# Patient Record
Sex: MALE | Race: WHITE | Employment: OTHER | URBAN - METROPOLITAN AREA
[De-identification: names, ages, dates, MRNs, and addresses within clinical notes are randomized per-mention and may not be internally consistent; named-entity substitution may affect disease eponyms.]

---

## 2019-03-04 ENCOUNTER — OFFICE VISIT (OUTPATIENT)
Dept: ENDOCRINOLOGY | Age: 64
End: 2019-03-04
Payer: COMMERCIAL

## 2019-03-04 VITALS
SYSTOLIC BLOOD PRESSURE: 108 MMHG | WEIGHT: 175 LBS | BODY MASS INDEX: 28.12 KG/M2 | DIASTOLIC BLOOD PRESSURE: 72 MMHG | HEART RATE: 48 BPM | OXYGEN SATURATION: 97 % | HEIGHT: 66 IN

## 2019-03-04 DIAGNOSIS — M48.061 SPINAL STENOSIS AT L4-L5 LEVEL: ICD-10-CM

## 2019-03-04 DIAGNOSIS — G20 PARKINSON DISEASE (HCC): ICD-10-CM

## 2019-03-04 DIAGNOSIS — R73.01 IMPAIRED FASTING GLUCOSE: ICD-10-CM

## 2019-03-04 DIAGNOSIS — M19.90 ARTHRITIS: ICD-10-CM

## 2019-03-04 DIAGNOSIS — E05.00 GRAVES DISEASE: ICD-10-CM

## 2019-03-04 DIAGNOSIS — M81.0 AGE-RELATED OSTEOPOROSIS WITHOUT CURRENT PATHOLOGICAL FRACTURE: Primary | ICD-10-CM

## 2019-03-04 DIAGNOSIS — R79.89 ABNORMAL THYROID BLOOD TEST: ICD-10-CM

## 2019-03-04 PROBLEM — G20.A1 PARKINSON DISEASE: Status: ACTIVE | Noted: 2019-03-04

## 2019-03-04 PROCEDURE — 1101F PT FALLS ASSESS-DOCD LE1/YR: CPT | Performed by: INTERNAL MEDICINE

## 2019-03-04 PROCEDURE — G8419 CALC BMI OUT NRM PARAM NOF/U: HCPCS | Performed by: INTERNAL MEDICINE

## 2019-03-04 PROCEDURE — 99203 OFFICE O/P NEW LOW 30 MIN: CPT | Performed by: INTERNAL MEDICINE

## 2019-03-04 PROCEDURE — 3017F COLORECTAL CA SCREEN DOC REV: CPT | Performed by: INTERNAL MEDICINE

## 2019-03-04 PROCEDURE — G8427 DOCREV CUR MEDS BY ELIG CLIN: HCPCS | Performed by: INTERNAL MEDICINE

## 2019-03-04 PROCEDURE — G8484 FLU IMMUNIZE NO ADMIN: HCPCS | Performed by: INTERNAL MEDICINE

## 2019-03-04 SDOH — HEALTH STABILITY: MENTAL HEALTH: HOW OFTEN DO YOU HAVE A DRINK CONTAINING ALCOHOL?: NEVER

## 2019-03-07 ENCOUNTER — HOSPITAL ENCOUNTER (OUTPATIENT)
Dept: GENERAL RADIOLOGY | Age: 64
Discharge: HOME | End: 2019-03-07
Payer: COMMERCIAL

## 2019-03-07 DIAGNOSIS — M81.0 AGE-RELATED OSTEOPOROSIS WITHOUT CURRENT PATHOLOGICAL FRACTURE: ICD-10-CM

## 2019-03-07 DIAGNOSIS — R73.01 IMPAIRED FASTING GLUCOSE: ICD-10-CM

## 2019-03-07 DIAGNOSIS — M19.90 ARTHRITIS: ICD-10-CM

## 2019-03-07 DIAGNOSIS — R79.89 ABNORMAL THYROID BLOOD TEST: ICD-10-CM

## 2019-03-07 DIAGNOSIS — G20 PARKINSON DISEASE (HCC): ICD-10-CM

## 2019-03-07 DIAGNOSIS — M48.061 SPINAL STENOSIS AT L4-L5 LEVEL: ICD-10-CM

## 2019-03-07 PROCEDURE — 77080 DXA BONE DENSITY AXIAL: CPT

## 2019-03-11 DIAGNOSIS — R79.89 ABNORMAL THYROID BLOOD TEST: ICD-10-CM

## 2019-03-11 DIAGNOSIS — R73.01 IMPAIRED FASTING GLUCOSE: ICD-10-CM

## 2019-03-11 DIAGNOSIS — G20.A1 PARKINSON DISEASE: ICD-10-CM

## 2019-03-11 DIAGNOSIS — M48.061 SPINAL STENOSIS AT L4-L5 LEVEL: ICD-10-CM

## 2019-03-11 DIAGNOSIS — E05.00 GRAVES DISEASE: ICD-10-CM

## 2019-03-11 DIAGNOSIS — M19.90 ARTHRITIS: ICD-10-CM

## 2019-03-11 DIAGNOSIS — M81.0 AGE-RELATED OSTEOPOROSIS WITHOUT CURRENT PATHOLOGICAL FRACTURE: ICD-10-CM

## 2019-03-11 LAB
24HR URINE VOLUME (ML): 2200 ML
A/G RATIO: 1.4 (ref 1.1–2.2)
ALBUMIN SERPL-MCNC: 4.2 G/DL (ref 3.4–5)
ALP BLD-CCNC: 98 U/L (ref 40–129)
ALT SERPL-CCNC: <5 U/L (ref 10–40)
ANION GAP SERPL CALCULATED.3IONS-SCNC: 20 MMOL/L (ref 3–16)
AST SERPL-CCNC: 9 U/L (ref 15–37)
BILIRUB SERPL-MCNC: <0.2 MG/DL (ref 0–1)
BUN BLDV-MCNC: 11 MG/DL (ref 7–20)
CALCIUM 24 HOUR URINE: 334 MG/24 HR (ref 42–353)
CALCIUM SERPL-MCNC: 9.8 MG/DL (ref 8.3–10.6)
CHLORIDE BLD-SCNC: 103 MMOL/L (ref 99–110)
CO2: 20 MMOL/L (ref 21–32)
CREAT SERPL-MCNC: 0.8 MG/DL (ref 0.8–1.3)
CREAT SERPL-MCNC: 0.8 MG/DL (ref 0.8–1.3)
CREATININE 24 HOUR URINE: 1.4 G/24HR (ref 0.6–2.5)
CREATININE CLEARANCE: 122 ML/MIN (ref 100–140)
FOLLICLE STIMULATING HORMONE: 9.5 MIU/ML
GFR AFRICAN AMERICAN: >60
GFR NON-AFRICAN AMERICAN: >60
GLOBULIN: 2.9 G/DL
GLUCOSE BLD-MCNC: 112 MG/DL (ref 70–99)
LUTEINIZING HORMONE: 8.9 MIU/ML
Lab: 24 HR
PARATHYROID HORMONE INTACT: 25.6 PG/ML (ref 14–72)
PHOSPHORUS: 3.7 MG/DL (ref 2.5–4.9)
POTASSIUM SERPL-SCNC: 4.4 MMOL/L (ref 3.5–5.1)
SODIUM 24 HOUR URINE: 150 MMOL/24 HR (ref 40–220)
SODIUM BLD-SCNC: 143 MMOL/L (ref 136–145)
T4 TOTAL: 6 UG/DL (ref 4.5–10.9)
TOTAL PROTEIN: 7.1 G/DL (ref 6.4–8.2)
TSH SERPL DL<=0.05 MIU/L-ACNC: 1.58 UIU/ML (ref 0.27–4.2)

## 2019-03-12 LAB
ANTI-THYROGLOB ABS: <10 IU/ML
ESTIMATED AVERAGE GLUCOSE: 128.4 MG/DL
HBA1C MFR BLD: 6.1 %
THYROID PEROXIDASE (TPO) ABS: 10 IU/ML

## 2019-03-13 LAB
ALK PHOS BONE SPECIFIC: 13.6 UG/L (ref 6.5–20.1)
CELIAC PANEL: 6 UNITS (ref 0–19)
SEX HORMONE BINDING GLOBULIN: 87 NMOL/L (ref 11–80)
TESTOSTERONE FREE PERCENT: 1 % (ref 1.6–2.9)
TESTOSTERONE FREE, CALC: 44 PG/ML (ref 47–244)
TESTOSTERONE TOTAL-MALE: 446 NG/DL (ref 300–720)
TESTOSTERONE, BIOAVAILABLE: 111 NG/DL (ref 131–682)
TSH RECEPTOR AB: <0.9 IU/L

## 2019-03-16 LAB — THYROID STIMULATING IMMUNOGLOBULIN: 89 %

## 2019-04-03 ENCOUNTER — OFFICE VISIT (OUTPATIENT)
Dept: ENDOCRINOLOGY | Age: 64
End: 2019-04-03
Payer: COMMERCIAL

## 2019-04-03 VITALS
HEART RATE: 75 BPM | WEIGHT: 179.8 LBS | DIASTOLIC BLOOD PRESSURE: 68 MMHG | HEIGHT: 66 IN | OXYGEN SATURATION: 99 % | SYSTOLIC BLOOD PRESSURE: 104 MMHG | BODY MASS INDEX: 28.9 KG/M2

## 2019-04-03 DIAGNOSIS — E29.1 HYPOGONADISM IN MALE: Primary | ICD-10-CM

## 2019-04-03 DIAGNOSIS — M81.0 AGE-RELATED OSTEOPOROSIS WITHOUT CURRENT PATHOLOGICAL FRACTURE: ICD-10-CM

## 2019-04-03 DIAGNOSIS — Z00.00 ENCOUNTER FOR SCREENING AND PREVENTATIVE CARE: ICD-10-CM

## 2019-04-03 DIAGNOSIS — M19.90 ARTHRITIS: ICD-10-CM

## 2019-04-03 DIAGNOSIS — G20 PARKINSON DISEASE (HCC): ICD-10-CM

## 2019-04-03 DIAGNOSIS — M48.061 SPINAL STENOSIS AT L4-L5 LEVEL: ICD-10-CM

## 2019-04-03 LAB — PROSTATE SPECIFIC ANTIGEN: 0.88 NG/ML (ref 0–4)

## 2019-04-03 PROCEDURE — 1123F ACP DISCUSS/DSCN MKR DOCD: CPT | Performed by: INTERNAL MEDICINE

## 2019-04-03 PROCEDURE — 99215 OFFICE O/P EST HI 40 MIN: CPT | Performed by: INTERNAL MEDICINE

## 2019-04-03 PROCEDURE — 1036F TOBACCO NON-USER: CPT | Performed by: INTERNAL MEDICINE

## 2019-04-03 PROCEDURE — G8427 DOCREV CUR MEDS BY ELIG CLIN: HCPCS | Performed by: INTERNAL MEDICINE

## 2019-04-03 PROCEDURE — 4040F PNEUMOC VAC/ADMIN/RCVD: CPT | Performed by: INTERNAL MEDICINE

## 2019-04-03 PROCEDURE — G8419 CALC BMI OUT NRM PARAM NOF/U: HCPCS | Performed by: INTERNAL MEDICINE

## 2019-04-03 PROCEDURE — 3017F COLORECTAL CA SCREEN DOC REV: CPT | Performed by: INTERNAL MEDICINE

## 2019-04-03 RX ORDER — TESTOSTERONE 16.2 MG/G
1 GEL TRANSDERMAL DAILY
Qty: 1 BOTTLE | Refills: 3 | Status: SHIPPED | OUTPATIENT
Start: 2019-04-03 | End: 2019-08-20

## 2019-04-03 NOTE — PROGRESS NOTES
Nolberto Meng MD  [facility]) Physicians  [facility] [place] and [facility]ology  Rheumatology Consult Note    HISTORY OF PRESENT ILLNESS:    The pt is a 71 y.o. male referred by Dr. Baldev Toussaint for joint pain. Pt reports a 15 yr Hx of b/l wrist pain worse w/ flexion, he reports 2 episodes of sudden onset swelling and pain in his wrist joints over the past yr most recently this occurred 3-4 months ago, each episode self resolves after a couple of days. He reports constant stiffness in his fingers, this does not improve throughout the day. He denies any joint swelling in the hand joints. Denies Hx of podagra. He is no longer able to operate his . He is s/p b/l CTS surgery, R TKR and was told that his L knee also needs to get replaced. Knees hurt sometimes w/ walking, denies joint swelling. Pt states he does not take any medication for his joint pain including NSAIDs, Acetaminophen, or steroids. Of note, pt reports an extensive Hx of neck and lower back degenerative arthritis and spinal stenosis. He has undergone fusion of his C- and L-spine. He currently reports worsening pain in his neck, he reports difficulty driving d/t neck pain - he reports limited lateral rotation d/t his neck pain. He states that he initially started having pain in his neck in his 45s and lower back pain around age 38. He thinks his back pain was exacerbated by activity as he previously worked as a mailman, he does not recall significant morning stiffness. He states he cannot feel \"hot or cold\" on the bottom of his R foot, this numbness radiates up to his R thigh, L foot feels \"very sensitive\". He feels a burning pain throughout his BLE. He does know if he has any LE weakness, he states he is very [place]" when he walks and he uses a walker to get around. He denies any bowel or bladder incontinence, denies saddle anesthesia.   He is currently following w/ [facility], his recent CT cervical and lumbar spine from 3/12/19 showed hardware loosening in the C-spine and unstable L4-5 fusion. Pt states he has an upcoming appt w/ Neurosurgery next wk. Eyes turn red at times but he denies any dry eyes, gritty sensation or vision change. Denies Hx of enthesitis, PsO or Sx of IBD. Pt currently follows w/ Endocrinology for the management of his osteoporosis. He has a Hx of vertebral fax (T8) from falling off a ladder in 2009, L humeral fx from falling in the shower. Pt recalls he previously took Fosamax for \"a couple of yrs\". Per Dr. Edilberto Jenkins note pt was then switched to [facility] [place] however pt does not recall this. He states that he came off bisphosphonate therapy d/t dental implants. Pt states he was recently started on topical Testosterone. His mother had deforming arthritis, he thinks this was RA. Sister, brother and son both have Grave's disease. Brother has Crohn's disease. Pt is a former smoker, previously smoked 10 yrs. Denies Hx of alcohol or drug abuse.     REVIEW OF SYSTEMS:    Constitutional: +chronic fatigue, denies fever/chills, night sweats, unintentional weight loss  Integumentary: denies photosensitivity, rash, patchy alopecia, or Sx of Raynaud's phenomenon  Eyes: denies dry eyes, redness or pain, visual disturbance, or floaters  Ears: denies hearing loss, tinnitus, vertigo, or recurrent ear infections  Nose: denies nasal ulcers or recurrent sinusitis  Oral cavity: denies dry mouth or oral ulcers  Cardiovascular: denies CP, palpitations, Hx of pericardial effusion or pericarditis  Respiratory: denies SOB, cough, hemoptysis, or pleurisy  Gastrointestinal: denies heart burn, dysphagia or esophageal dysmotility, denies change in bowel habits or Sx of IBD  Genitourinary: denies change in urine amount or urine appearance, denies frothy urine or Hx of nephrolithiasis  Hematologic/Lymphatic: denies abnormal bruising or bleeding, denies Hx of blood clots, denies swollen LNs  Musculoskeletal:  refer file     Forced sexual activity: Not on file   Other Topics Concern    Not on file   Social History Narrative    Not on file        Family History   Problem Relation Age of Onset    Osteoporosis Mother     Cancer Father         skin     Prostate Cancer Brother 77       MEDICATIONS:    Current Outpatient Medications:     carbidopa-levodopa (SINEMET)  MG per tablet, Take 3 tabs five times per day, Disp: , Rfl: 5    Multiple Vitamin (MULTI VITAMIN MENS PO), Take 1 tablet by mouth Take 1 tab po daily, Disp: , Rfl:     Cholecalciferol (VITAMIN D PO), Take 50,000 Units by mouth, Disp: , Rfl:     Testosterone (ANDROGEL) 20.25 MG/ACT (1.62%) GEL gel, Place 1 actuation onto the skin daily for 120 days. , Disp: 1 Bottle, Rfl: 3    ALLERGIES:  Patient has no known allergies.     PHYSICAL EXAM:    Vitals:    /87 (Site: Left Upper Arm, Position: Sitting, Cuff Size: Medium Adult)   Pulse 69   Ht 5' 5.1\" (1.654 m)   Wt 180 lb (81.6 kg)   BMI 29.86 kg/m²     GEN: AAOx3, in NAD, accompanied by wife  HEAD: normocephalic, atraumatic  EYES: EOMI, PERRLA, no injection or icterus  NOSE: no nasal ulcers or nasal drainage  ORAL CAVITY: dry oral mucosa, no oral lesions, no evidence of thrush, no evidence of parotid gland enlargement  NECK: supple w/ FROM, of evidence of lymphadenopathy  CVS: RRR, no murmurs rubs or gallops  LUNGS: in no acute respiratory distress, CTAB  ABDOMEN: +BS, soft, NT/ND, no evidence of organomegaly  LYMPH: no lymphadenopathy or palpable masses  MSK:   Spine: there is significant kyphosis of the T-spine, C-spine and paraspinal and shoulder strap muscles diffusely TTP, C-spine w/ limited ROM, SI joints NTTP  Upper extremities:   Hands: no synovitis in the MCP, PIP, or DIP joints b/l, no dactylitis, able to make full fists w/ good  strength b/l   Wrist: there is extensive bony hypertrophy of the b/l wrist joints, L>R, w/ significant fusion of the wrist joints and very limited flexion/extension, no synovitis, wrist joints NTTP   Elbow: no synovitis or bursitis, FROM   Shoulders: no pain or swelling or warmth on palpation, FROM  Lower extremities:   Hip: negative CHIQUIS test b/l, trochanteric bursa NTTP b/l   Knees: s/p R TKR, no warmth or effusion present, +crepitus in L knee   Ankles: no synovitis, FROM   Feet: no toe swelling or pain or warmth on palpation w/ FROM, negative MTP squeeze test  NEURO: there is a resting tremor and bradykinesia, CN II-XII grossly intact intact, face appears symmetrical, brisk patellar reflexes b/l, 5/5 strength proximally and distally throughout in both upper and lower extremities, decreased sharp vs. dull sensation most pronounced in R foot radiating up to the R thigh > LLE  INTEGUMENTARY: no rash or psoriatic lesions, no petechiae, bruises, or palpable purpura, no patchy alopecia, no nail or periungual changes, no clubbing or digital ulcers  PSYCH: blunted affect    LABS:  I personally reviewed prior labs including:  CBC w/ diff (2/25/19): normal WBC, normal H/H, plt count 431  CMP (3/11/19): normal renal and hepatic function  TSH, T4, thyroid peroxidase, anti-thyroglob Ab negative (3/11/19)  HgbA1c 6.1 (3/11/19)    RADIOLOGY REVIEW:  I personally reviewed prior imaging including:  CT C-spine (3/12/19):  Postoperative findings of multilevel anterior and posterior fusion and laminectomy w/ significant loosening and extrusion of the posterior fusion hardware at C5, C6, and T1. The interbody grafts at C4-5 and C5-6 are subtotally incorporated with minimal subtle questionable lucency around the C4 screws, no definite screw loosening at C5 or C6. Advanced multilevel cervical degenerative disc disease and facet arthrosis causing multilevel neural foraminal narrowing. Other level specific details are discussed above and on the prior MRI report. The cord compression and cord signal abnormality at C6-7 is much better demonstrated on MRI.      CT L-spine (3/12/19):  Postoperative findings of L4-S1 fusion again identified with similar appearance of bilateral L4 pedicle screw loosening, with subtle increased anterolisthesis at L4-5. Asymmetric widening of a left facet defect at L4-5 is similar to slightly more evident, with suspected pseudoarthrosis along the medial margin of a previously ankylosed right L4-5 facet joint. Unstable L4-5 fusion is again suspected. Definite loosening of the right L5 pedicle screw is not seen. The L5-S1 fusion appears solid. Multilevel degenerative disc disease and facet stenosis with other level specific details discussed above. The thecal sac is again obscured at the postoperative sites. Above results were discussed w/ the pt in detail during today's visit. ASSESSMENT/PLAN:  71 y.o. male p/w chronic arthralgias, neck and lower back pain. PMHx pertinent for Hx of osteoporosis, Parkinson's disease, s/p b/l CTS surgery, cervical myelopathy s/p C5-T1 posterior decompression fixation and fusion w/ concern for hardware failure, spinal stenosis and degenerative arthritis of the L-spine w/ unstable L4-5 fusion seen on recent CT. Physical exam finding of significant wrist fusion on exam suggestive of long standing inflammatory arthritis, DDx includes CPPD arthropathy, RA type w/ Hx of pseudogout. He has a strong FHx of autoimmune disease. Will obtain rheumatologic w/u for inflammatory arthritis including spondyloarthropathy given his long standing back pain although he does not recall Hx of prolonged morning stiffness. Recent CT L-spine from 3/12/19 showed \"mild b/l SI joint arthrosis\". I personally reviewed his most recent Neurosurgery note from 3/5/19, recent imaging and w/u, pt has close f/u w/ Neurosurgery next wk. For his LE paresthesias, I suggested obtaining an EMG study and f/u w/ his currently neurologist, would consider a trial of Gabapentin for his neuropathic pain.   Pt prefers to obtain EMG study at [facility] facility however, placed order for EMG study today.     Orders Placed This Encounter   Procedures    XR HAND LEFT (MIN 3 VIEWS)     Standing Status:   Future     Number of Occurrences:   1     Standing Expiration Date:   4/4/2020     Scheduling Instructions:      Evaluate for joint space narrowing, erosion, and chondrocalcinosis     Order Specific Question:   Reason for exam:     Answer:   Evaluate for joint space narrowing, erosion, and chondrocalcinosis    XR HAND RIGHT (MIN 3 VIEWS)     Standing Status:   Future     Number of Occurrences:   1     Standing Expiration Date:   4/4/2020     Scheduling Instructions:      Evaluate for joint space narrowing, erosion, and chondrocalcinosis     Order Specific Question:   Reason for exam:     Answer:   Evaluate for joint space narrowing, erosion, and chondrocalcinosis    XR SacroilIAC Joints PA and Oblique     Standing Status:   Future     Number of Occurrences:   1     Standing Expiration Date:   5/4/2019     Order Specific Question:   Reason for exam:     Answer:   evaluate for sacroiliitis or evidence of inflammatory changes seen in spondyloarthropathy    C-Reactive Protein     Standing Status:   Future     Number of Occurrences:   1     Standing Expiration Date:   5/4/2019    Sedimentation Rate     Standing Status:   Future     Number of Occurrences:   1     Standing Expiration Date:   5/4/2019    Hepatitis B Core Antibody, Total     Standing Status:   Future     Number of Occurrences:   1     Standing Expiration Date:   5/4/2019    Hepatitis B Surface Antigen     Standing Status:   Future     Number of Occurrences:   1     Standing Expiration Date:   5/4/2019    Hepatitis C Antibody     Standing Status:   Future     Number of Occurrences:   1     Standing Expiration Date:   0/6/6868    Cyclic Citrul Peptide Antibody, IgG     Standing Status:   Future     Number of Occurrences:   1     Standing Expiration Date:   5/4/2019    Rheumatoid Factor     Standing Status:   Future     Number of Occurrences:   1     Standing Expiration Date:   5/4/2019    HLA-B27 Antigen     Standing Status:   Future     Number of Occurrences:   1     Standing Expiration Date:   5/4/2019    Electrophoresis Protein, Serum without Reflex to Immunofixation     Standing Status:   Future     Number of Occurrences:   1     Standing Expiration Date:   6/4/2019    Protein Electrophoresis, Urine     Standing Status:   Future     Number of Occurrences:   1     Standing Expiration Date:   6/4/2019   Carolyn Figueredo MD (Inpatient and Outpatient EMG), [facility]     Referral Priority:   Routine     Referral Type:   Eval and Treat     Referral Reason:   Specialty Services Required     Referred to Provider:   Ervin Mott MD     Requested Specialty:   Neurology     Number of Visits Requested:   1     Outpatient Encounter Medications as of 4/4/2019   Medication Sig Dispense Refill    carbidopa-levodopa (SINEMET)  MG per tablet Take 3 tabs five times per day  5    Multiple Vitamin (MULTI VITAMIN MENS PO) Take 1 tablet by mouth Take 1 tab po daily      Cholecalciferol (VITAMIN D PO) Take 50,000 Units by mouth      Testosterone (ANDROGEL) 20.25 MG/ACT (1.62%) GEL gel Place 1 actuation onto the skin daily for 120 days. 1 Bottle 3     No facility-administered encounter medications on file as of 4/4/2019. Return in about 2 weeks (around 4/18/2019) for lab result discussion and treatment plan. 4/4/2019 3:31 PM      Thank you very much for allowing me to participate in this patient's care. Please do not hesitate to contact me if I can be of further assistance. Danica Mccallum MD  [facility]) Physicians  [facility]  [address] [place] [address] Dr Wu S [address]:[phone]  PAL:[phone]    NOTE: This report is transcribed by using voice recognition software dragon.  Every effort is made to ensure accuracy; however, inadvertent computerized transcription errors may be present.

## 2019-04-03 NOTE — PROGRESS NOTES
Patient is being referred here for evaluation and management of Osteoporosis. He has been diagnosed with Osteoporosis in 2013 by his orthopedic surgeon Dr. Grupo Weinberg he was started on Fosamax, he recalls it was switched to [facility] [place] monthly, then he needed dental implants so the bisphosphonates were stopped and were never resumed after wards. As he never followed up with the orthopedic doctor again. He also remembers that his dental implant was not successful . He has taken the following medications for his Osteoporosis:FOsamax     His last DXA scan was done at [facility] results not available . He has no history of fracture of long bones of upper or lower legs. He has  history of vertebral fractures T8 after a fall from ladder in 2009( fall 6-8 feet )   . He has approximately 3 inches height loss as compared to his  young age. He has underwent C-spine fusion surgery as well as lumbar spine fusion surgery as well    He  does  take vitamin D supplements. + MVI. He  has  1-2 daily servings of cheese, milk or yogurt daily. He exercises regularly. He  has no history of long term steroids, chemotherapy, multiple myeloma, prolonged immobilization, liver or kidney disease, malabsorption, organ transplant, or immunosuppressive medications. No history of diabetes. No history of any other agents with adverse effects on bony skeleton. He is not prone to falls and no history of recent falls. No history of rheumatoid arthritis or other autoimmune disorders. positive history of kidney stones in the past once . No history of proximal muscle weakness, thinning of skin, easy bruisability, centripetal accumulation of fat and excessive weight gain recently to suggest Cushing's syndrome. He does not smoke. No excessive alcohol use. There is no parental history of hip fracture. No family history of Osteoporosis.      He has  dental implants and stopped fosamax a year before that and never resumed it  and there is no upcoming dental surgeries planned. He has severe OA s/p rt knee replacement   He has spinal stenosis   He has neck surgery at C spine level Fusion done   He has lower lumbar spine surgery   He has Parkinson and is having trouble walking   He had carpal tunnel surgery done in the past as well    Neurosurgery visits revealed  cervical myelopathy s/p C5-T1 posterior decompression fixation and fusion. . There was concern of hardware failure on cervical x-ray. He was told that he would require revision of these fusion surgeries in future but the quality of bone during these operations were was found to be week and hence was referred for osteoporosis    Family hx of Stephen Obrien ,son has Graves and one of his sisters have severe Graves Orbitopathy and required surgical decompression of her eyes    Review of Systems:    I have reviewed the review of system questionnaire filled by the patient .   Patient was advised to contact PCP for non endocrine signs and symptoms     Past Medical History:   Diagnosis Date    Migraine     cluster     Parkinson disease ([facility] [address]      Past Surgical History:   Procedure Laterality Date    CARPAL TUNNEL RELEASE      x2 R, x1 L     HERNIA REPAIR      childhood & at 17 (L) side     KNEE ARTHROSCOPY Left     KNEE SURGERY Right     LUMBAR FUSION      NECK SURGERY      x2    TONSILLECTOMY        Social History     Socioeconomic History    Marital status:      Spouse name: Not on file    Number of children: Not on file    Years of education: Not on file    Highest education level: Not on file   Occupational History    Not on file   Social Needs    Financial resource strain: Not on file    Food insecurity:     Worry: Not on file     Inability: Not on file    Transportation needs:     Medical: Not on file     Non-medical: Not on file   Tobacco Use    Smoking status: Former Smoker    Smokeless tobacco: Never Used   Substance and Sexual Activity    Alcohol use: Never Frequency: Never    Drug use: Never    Sexual activity: Not on file   Lifestyle    Physical activity:     Days per week: Not on file     Minutes per session: Not on file    Stress: Not on file   Relationships    Social connections:     Talks on phone: Not on file     Gets together: Not on file     Attends Yazidism service: Not on file     Active member of club or organization: Not on file     Attends meetings of clubs or organizations: Not on file     Relationship status: Not on file    Intimate partner violence:     Fear of current or ex partner: Not on file     Emotionally abused: Not on file     Physically abused: Not on file     Forced sexual activity: Not on file   Other Topics Concern    Not on file   Social History Narrative    Not on file     Family History   Problem Relation Age of Onset    Osteoporosis Mother     Cancer Father         skin     Prostate Cancer Brother 77     Prior to Admission medications    Medication Sig Start Date End Date Taking?  Authorizing Provider   carbidopa-levodopa (SINEMET)  MG per tablet Take 3 tabs five times per day 2/28/19  Yes Historical Provider, MD   Multiple Vitamin (MULTI VITAMIN MENS PO) Take 1 tablet by mouth Take 1 tab po daily 10/8/08  Yes Historical Provider, MD   Cholecalciferol (VITAMIN D PO) Take 50,000 Units by mouth 12/31/18  Yes Historical Provider, MD     No Known Allergies     OBJECTIVE:  /68   Pulse 75   Ht 5' 6\" (1.676 m)   Wt 179 lb 12.8 oz (81.6 kg)   SpO2 99%   BMI 29.02 kg/m²      Constitutional: no acute distress, well appearing and well nourished  Psychiatric: oriented to person, place and time, judgement and insight and normal, recent and remote memory and intact and mood and affect are normal  Skin: skin and subcutaneous tissue is normal without mass, normal turgor  Head and Face: examination of head and face revealed no abnormalities  Eyes: no lid or conjunctival swelling, erythema or discharge, pupils are normal, equal, round, reactive to light  Ears/Nose: external inspection of ears and nose revealed no abnormalities, hearing is grossly normal  Oropharynx/Mouth/Face: lips, tongue and gums are normal with no lesions, the voice quality was normal  Neck: neck is supple and symmetric, with midline trachea and no masses, thyroid is normal  Lymphatics: normal cervical lymph nodes, normal supraclavicular nodes  Pulmonary: no increased work of breathing or signs of respiratory distress, lungs are clear to auscultation  Cardiovascular: normal heart rate and rhythm, normal S1 and S2, no murmurs and pedal pulses and 2+ bilaterally, No edema  Abdomen: abdomen is soft, non-tender with no masses  Musculoskeletal: normal gait and station and exam of the digits and nails are normal  Neurological: normal coordination and normal general cortical function    Assessment/Plan:    1. Age-related osteoporosis    I  reviewed the previous history and work-up pertaining to management of Osteoporosis. Most recent DEXA scan was in March of 2019 done at [facility] which shows lumbar spine T score of minus 1.3, left hip T score of point 2 which is within normal limits, right femoral neck has a T score of minus 1.6 which is osteopenic. This was discussed with the patient in detail he did had a prior history of thoracic spine fracture. Due to the observation by neurosurgeon on the quality of bone the question arises of possible osteoporosis, although DEXA scan shows bone mineral density in osteopenic range. We can consider therapies like teriparatide. At this point I'll start with supplementation of testosterone. I will order the following limited work-up for pertinent secondary causes of osteoporosis :  I had ordered a workup for secondary osteoporosis and his 24-hr urine calcium and creatinine was normal at 330 for on March 2019. Thyroid function test as well as parathyroid hormone levels were also within normal limits .  His celiac disease panel was negative and born turnover markers were not done although they were ordered. He is noted to have slightly lowered testosterone level. I had a detailed discussion with the patient regarding various treatment options for Osteoporosis discussing merits and side effects of available medications. Patient was counseled on adequate calcium and Vitamin D intake and on fall precautions to minimize fracture risk. Advised to take a total of 1200 mg of elemental calcium (including diet and supplements). She was provided with written information on sources of dietary calcium and general advice on maintaining optimal skeletal health. We also discussed the side effects and contraindications of Forteo therapy including increased risk of osteosarcoma in animal studies. Patient has no apparent contraindications to Forteo therapy     Hypogonadism with inappropriately normal FSH and LH  We discussed treatment with testosterone supplement I went over all possible risks and side effects associated with this medication. He was also given a handout on side effects from testosterone therapy he would like to proceed with starting topical testosterone supplementation he was advised to get blood work done for testosterone and hemoglobin and hematocrit of month after starting testosterone  His PSA was within normal limits which was done on April third 2019    2. Parkinson disease   He has been on medication for years   Stable follows with neurology. Also had Kamille's syndrome and cluster migraine headaches    3. Severe  Arthritis  He had Rt  knee replacement in the past  He appears to have significant arthritic complaints probably he will benefit from referral to a rheumatologist as well. Due to numerous arthritic related issues I did give him a referral for rheumatologist    4.  Spinal stenosis at L4-L5 level  Status post decompression fusion           Reviewed and/or ordered clinical lab results Yes  Reviewed and/or ordered radiology tests Yes  Reviewed and/or ordered other diagnostic tests Yes  Discussed test results with performing physician Yes  Made a decision to obtain old records Yes  Reviewed and summarized old records Yes      Cecille Silva was counseled regarding symptoms of current diagnosis, course and complications of disease if inadequately treated, side effects of medications, diagnosis, treatment options, and prognosis, risks, benefits, complications, and alternatives of treatment, labs, imaging and other studies and treatment targets and goals. He understands instructions and counseling. These diagnosis were discussed and reviewed with the patient including the advantages of drug therapy. He was counseled at this visit on the following: diabetes complication prevention and foot care. I spent 40+  minutes with patient and more than 50 % of this time was spent discussing and providing counseling and coordinating care of patient's multiple health issues    No follow-ups on file. Please note that some or all of this report was generated using voice recognition software. Please notify me in case of any questions about the content of this document, as some errors in transcription may have occurred .

## 2019-04-03 NOTE — PATIENT INSTRUCTIONS
Patient Education        testosterone topical  Pronunciation:  jacquelyn TOS ter one TOP i pamela  Brand: Androderm, AndroGel Packets, AndroGel Pump 1.25 g/actuation, Axiron, FIRST-Testosterone, Fortesta, Testim, Vogelxo  What is the most important information I should know about testosterone topical?  You should not use this medicine if you have prostate cancer or male breast cancer. Testosterone can cause birth defects in an unborn baby. A pregnant woman should avoid coming into contact with this medicine, or with a man's skin where the medicine has been applied. Topical testosterone is absorbed through the skin and can cause side effects or symptoms of male features in a child or woman who comes into contact with this medicine. Call your doctor if a person who has close contact with you develops enlarged genitals, premature pubic hair, increased libido, aggressive behavior, male-pattern baldness, excessive body hair growth, increased acne, irregular menstrual periods, or any signs of male characteristics. Misuse of testosterone can cause dangerous or irreversible effects. Never use more than your prescribed dose. Do not share this medicine with another person. What is testosterone topical?  Testosterone is a naturally occurring male hormone necessary for many processes in the body. Testosterone topical (for the skin) is used to treat conditions in men that result from a lack of natural testosterone. Testosterone will not enhance athletic performance and should not be used for that purpose. Testosterone topical may also be used for purposes not listed in this medication guide. What should I discuss with my healthcare provider before using testosterone topical?  You should not use this medicine if you are allergic to testosterone patches or gels, or if you have;  · prostate cancer; or  · male breast cancer.   To make sure testosterone is safe for you, tell your doctor if you have ever had:  · cancer;  · enlarged prostate, urination problems;  · sleep apnea (breathing stops during sleep);  · heart disease, heart attack, or stroke;  · a blood clot;  · diabetes; or  · liver disease or kidney disease. Older men who use testosterone topical may have an increased risk of prostate enlargement or cancer. If you are over 72, talk with your doctor about your specific risk. This medicine should not be used by a woman. Testosterone can cause birth defects in an unborn baby. A pregnant woman should avoid coming into contact with testosterone topical patches or gels, or with a man's skin areas where a patch has been worn or the gel has been applied. If contact does occur, wash with soap and water right away. Do not use testosterone topical on anyone younger than 25years old. The testosterone transdermal patch may burn your skin if you wear the patch during an MRI (magnetic resonance imaging). Remove the patch before undergoing such a test.  How should I use testosterone topical?  Follow all directions on your prescription label. Never use testosterone topical in larger amounts, or for longer than prescribed. Misuse of testosterone can cause dangerous or irreversible effects, such as enlarged breasts, small testicles, infertility, high blood pressure, heart attack, stroke, liver disease, bone growth problems, addiction, and mental effects such as aggression and violence. Do not share this medicine with another person. Different brands of testosterone topical have different instructions for use. Not every brand of this medicine is used on the same skin areas. Some brands are applied to the shoulder, upper arm, or stomach. Other brands are applied to the thighs or to the underarms. Carefully follow the patient instructions provided with your medicine. Do not apply testosterone topical to your penis or scrotum. Some brands of this medicine should also not be applied to the back, chest, or stomach areas.  Apply this medicine only to testosterone topical?  Do not apply this medicine to your penis or your scrotum. Avoid swimming, bathing, or showering for 2 to 5 hours after applying testosterone gel. Follow the directions provided with your specific brand. Avoid using lotions, oils, or other skin products on the area where you will apply the skin patch. The patch may not stick properly to the skin. Testosterone gel may be flammable. Avoid using near open flame, and do not smoke until the gel has completely dried on your skin. If your doctor recommends a topical steroid medicine such as hydrocortisone to treat skin irritation caused by wearing a testosterone skin patch, avoid using an ointment form of the steroid. What are the possible side effects of testosterone topical?  Get emergency medical help if you have signs of an allergic reaction: hives; difficulty breathing; swelling of your face, lips, tongue, or throat. Stop using testosterone topical and call your doctor at once if you have:  · increased urination (many times per day), loss of bladder control;  · painful or difficult urination;  · breast pain or swelling;  · painful or bothersome erections;  · swelling, rapid weight gain, shortness of breath during sleep;  · chest pain or pressure, pain spreading to your jaw or shoulder;  · liver problems --nausea, upper stomach pain, itching, tired feeling, loss of appetite, dark urine, hao-colored stools, jaundice (yellowing of the skin or eyes);  · signs of a blood clot in the lung --chest pain, sudden cough, wheezing, rapid breathing, coughing up blood; or  · signs of a blood clot in your leg --pain, swelling, warmth, or redness in one or both legs. Topical testosterone is absorbed through the skin and can cause symptoms of male features in a woman or child who comes into contact with the medication.  Call your doctor if your female partner has male-pattern baldness, excessive body hair growth, increased acne, irregular menstrual periods, or any other signs of male characteristics. Common side effects may include:  · redness, itching, burning, hardened skin or other irritation where the medicine was applied or where the skin patch was worn;  · headache, mood changes;  · increased red blood cells (may cause dizziness, itching, redness in your face, or muscle pain);  · vomiting, diarrhea;  · strange dreams;  · frequent or prolonged erections; or  · high blood pressure --severe headache, blurred vision, pounding in your neck or ears. This is not a complete list of side effects and others may occur. Call your doctor for medical advice about side effects. You may report side effects to FDA at 1-626-ZLN-7017. What other drugs will affect testosterone topical?  Tell your doctor about all your current medicines and any you start or stop using, especially:  · insulin;  · a blood thinner (warfarin, Coumadin, Jantoven); or  · steroid medicine (methylprednisolone, prednisone, and others.)  This list is not complete. Other drugs may interact with testosterone, including prescription and over-the-counter medicines, vitamins, and herbal products. Not all possible interactions are listed in this medication guide. Where can I get more information? Your pharmacist can provide more information about testosterone topical.  Remember, keep this and all other medicines out of the reach of children, never share your medicines with others, and use this medication only for the indication prescribed. Every effort has been made to ensure that the information provided by [address] is accurate, up-to-date, and complete, but no guarantee is made to that effect. Drug information contained herein may be time sensitive.  [employer] information has been compiled for use by healthcare practitioners and consumers in the [place] and therefore [employer] does not warrant that uses outside of the [place] are appropriate, unless specifically indicated otherwise. [facility] drug information does not endorse drugs, diagnose patients or recommend therapy. [facility] drug information is an informational resource designed to assist licensed healthcare practitioners in caring for their patients and/or to serve consumers viewing this service as a supplement to, and not a substitute for, the expertise, skill, knowledge and judgment of healthcare practitioners. The absence of a warning for a given drug or drug combination in no way should be construed to indicate that the drug or drug combination is safe, effective or appropriate for any given patient. [facility] does not assume any responsibility for any aspect of healthcare administered with the aid of information [facility] provides. The information contained herein is not intended to cover all possible uses, directions, precautions, warnings, drug interactions, allergic reactions, or adverse effects. If you have questions about the drugs you are taking, check with your doctor, nurse or pharmacist.  Copyright 4950-0981 [address]. Version: 14.01. Revision date: 8/15/2017. Care instructions adapted under license by [facility] ([facility]). If you have questions about a medical condition or this instruction, always ask your healthcare professional. [place] [address] any warranty or liability for your use of this information.

## 2019-04-04 ENCOUNTER — OFFICE VISIT (OUTPATIENT)
Dept: RHEUMATOLOGY | Age: 64
End: 2019-04-04
Payer: COMMERCIAL

## 2019-04-04 ENCOUNTER — HOSPITAL ENCOUNTER (OUTPATIENT)
Dept: GENERAL RADIOLOGY | Age: 64
Discharge: HOME | End: 2019-04-04
Payer: COMMERCIAL

## 2019-04-04 ENCOUNTER — HOSPITAL ENCOUNTER (OUTPATIENT)
Age: 64
Discharge: HOME | End: 2019-04-04
Payer: COMMERCIAL

## 2019-04-04 VITALS
BODY MASS INDEX: 29.99 KG/M2 | DIASTOLIC BLOOD PRESSURE: 87 MMHG | WEIGHT: 180 LBS | SYSTOLIC BLOOD PRESSURE: 120 MMHG | HEART RATE: 69 BPM | HEIGHT: 65 IN

## 2019-04-04 DIAGNOSIS — R20.2 PARESTHESIA: ICD-10-CM

## 2019-04-04 DIAGNOSIS — M25.50 MULTIPLE JOINT PAIN: ICD-10-CM

## 2019-04-04 DIAGNOSIS — M48.061 SPINAL STENOSIS AT L4-L5 LEVEL: ICD-10-CM

## 2019-04-04 DIAGNOSIS — M15.9 OSTEOARTHRITIS OF MULTIPLE JOINTS, UNSPECIFIED OSTEOARTHRITIS TYPE: ICD-10-CM

## 2019-04-04 DIAGNOSIS — M25.50 MULTIPLE JOINT PAIN: Primary | ICD-10-CM

## 2019-04-04 LAB
C-REACTIVE PROTEIN: 0.5 MG/L (ref 0–5.1)
HEPATITIS B SURFACE ANTIGEN INTERPRETATION: NORMAL
HEPATITIS C ANTIBODY INTERPRETATION: NORMAL
PROTEIN PROTEIN: 0.02 G/DL
PROTEIN PROTEIN: 23 MG/DL
RHEUMATOID FACTOR: <10 IU/ML
SEDIMENTATION RATE, ERYTHROCYTE: 7 MM/HR (ref 0–20)

## 2019-04-04 PROCEDURE — 85652 RBC SED RATE AUTOMATED: CPT

## 2019-04-04 PROCEDURE — 86803 HEPATITIS C AB TEST: CPT

## 2019-04-04 PROCEDURE — 86812 HLA TYPING A B OR C: CPT

## 2019-04-04 PROCEDURE — 84155 ASSAY OF PROTEIN SERUM: CPT

## 2019-04-04 PROCEDURE — 99244 OFF/OP CNSLTJ NEW/EST MOD 40: CPT | Performed by: INTERNAL MEDICINE

## 2019-04-04 PROCEDURE — 86200 CCP ANTIBODY: CPT

## 2019-04-04 PROCEDURE — 73130 X-RAY EXAM OF HAND: CPT

## 2019-04-04 PROCEDURE — 86704 HEP B CORE ANTIBODY TOTAL: CPT

## 2019-04-04 PROCEDURE — 86140 C-REACTIVE PROTEIN: CPT

## 2019-04-04 PROCEDURE — 72200 X-RAY EXAM SI JOINTS: CPT

## 2019-04-04 PROCEDURE — 3017F COLORECTAL CA SCREEN DOC REV: CPT | Performed by: INTERNAL MEDICINE

## 2019-04-04 PROCEDURE — 84165 PROTEIN E-PHORESIS SERUM: CPT

## 2019-04-04 PROCEDURE — G8419 CALC BMI OUT NRM PARAM NOF/U: HCPCS | Performed by: INTERNAL MEDICINE

## 2019-04-04 PROCEDURE — 87340 HEPATITIS B SURFACE AG IA: CPT

## 2019-04-04 PROCEDURE — 73560 X-RAY EXAM OF KNEE 1 OR 2: CPT

## 2019-04-04 PROCEDURE — 86431 RHEUMATOID FACTOR QUANT: CPT

## 2019-04-04 PROCEDURE — G8427 DOCREV CUR MEDS BY ELIG CLIN: HCPCS | Performed by: INTERNAL MEDICINE

## 2019-04-04 PROCEDURE — 84166 PROTEIN E-PHORESIS/URINE/CSF: CPT

## 2019-04-04 PROCEDURE — 84156 ASSAY OF PROTEIN URINE: CPT

## 2019-04-04 PROCEDURE — 36415 COLL VENOUS BLD VENIPUNCTURE: CPT

## 2019-04-05 ENCOUNTER — TELEPHONE (OUTPATIENT)
Dept: ENDOCRINOLOGY | Age: 64
End: 2019-04-05

## 2019-04-06 LAB
CCP IGG ANTIBODIES: 4 UNITS (ref 0–19)
HEPATITIS B CORE TOTAL ANTIBODY: NEGATIVE
HLA B27: NEGATIVE

## 2019-04-08 LAB
ALBUMIN SERPL-MCNC: 4 G/DL (ref 3.1–4.9)
ALPHA-1-GLOBULIN: 0.3 G/DL (ref 0.2–0.4)
ALPHA-2-GLOBULIN: 0.7 G/DL (ref 0.4–1.1)
BETA GLOBULIN: 1.1 G/DL (ref 0.9–1.6)
GAMMA GLOBULIN: 1.6 G/DL (ref 0.6–1.8)
SPE/IFE INTERPRETATION: NORMAL
TOTAL PROTEIN: 7.7 G/DL (ref 6.4–8.2)
URINE ELECTROPHORESIS INTERP: NORMAL

## 2019-04-08 NOTE — TELEPHONE ENCOUNTER
Fax from [facility]. Androgel is not approvable at this time. The use of this medication without 2 morning total testosterone levels than 300 ng per dL does not establish medical necessity for this drug.  Pt's over 40 require a baseline PSA and all pt's need a hematocrit provided level as well

## 2019-04-18 ENCOUNTER — OFFICE VISIT (OUTPATIENT)
Dept: RHEUMATOLOGY | Age: 64
End: 2019-04-18
Payer: COMMERCIAL

## 2019-04-18 VITALS
DIASTOLIC BLOOD PRESSURE: 87 MMHG | WEIGHT: 176 LBS | SYSTOLIC BLOOD PRESSURE: 121 MMHG | HEART RATE: 79 BPM | BODY MASS INDEX: 29.2 KG/M2

## 2019-04-18 DIAGNOSIS — M15.9 OSTEOARTHRITIS OF MULTIPLE JOINTS, UNSPECIFIED OSTEOARTHRITIS TYPE: ICD-10-CM

## 2019-04-18 DIAGNOSIS — Z79.899 HIGH RISK MEDICATION USE: ICD-10-CM

## 2019-04-18 DIAGNOSIS — G60.9 IDIOPATHIC PERIPHERAL NEUROPATHY: ICD-10-CM

## 2019-04-18 DIAGNOSIS — Z87.39 H/O CALCIUM PYROPHOSPHATE DEPOSITION DISEASE (CPPD): Primary | ICD-10-CM

## 2019-04-18 PROCEDURE — 4040F PNEUMOC VAC/ADMIN/RCVD: CPT | Performed by: INTERNAL MEDICINE

## 2019-04-18 PROCEDURE — G8427 DOCREV CUR MEDS BY ELIG CLIN: HCPCS | Performed by: INTERNAL MEDICINE

## 2019-04-18 PROCEDURE — 3017F COLORECTAL CA SCREEN DOC REV: CPT | Performed by: INTERNAL MEDICINE

## 2019-04-18 PROCEDURE — 1123F ACP DISCUSS/DSCN MKR DOCD: CPT | Performed by: INTERNAL MEDICINE

## 2019-04-18 PROCEDURE — 99214 OFFICE O/P EST MOD 30 MIN: CPT | Performed by: INTERNAL MEDICINE

## 2019-04-18 PROCEDURE — 1036F TOBACCO NON-USER: CPT | Performed by: INTERNAL MEDICINE

## 2019-04-18 PROCEDURE — G8419 CALC BMI OUT NRM PARAM NOF/U: HCPCS | Performed by: INTERNAL MEDICINE

## 2019-04-18 RX ORDER — GABAPENTIN 300 MG/1
300 CAPSULE ORAL 3 TIMES DAILY
Qty: 270 CAPSULE | Refills: 0 | Status: SHIPPED | OUTPATIENT
Start: 2019-04-18 | End: 2019-08-20 | Stop reason: SDUPTHER

## 2019-04-18 RX ORDER — COLCHICINE 0.6 MG/1
0.6 CAPSULE ORAL DAILY
Qty: 90 CAPSULE | Refills: 0 | Status: SHIPPED | OUTPATIENT
Start: 2019-04-18 | End: 2019-08-20

## 2019-04-18 NOTE — PATIENT INSTRUCTIONS
Calcium Pyrophosphate Deposition (CPPD)             Fast Facts     The risk of CPPD greatly increases with age, but it can occur in young people, too. Proper diagnosis depends on detecting calcium pyrophosphate crystals in the fluid of an affected joint. CPPD may be hard to diagnose because the joint pain and other symptoms can mimic gout and other types of arthritis. Diagnosis is confirmed by using a microscope to see small calcium pyrophosphate crystals in joint fluid. Anti-inflammatory medications reduce pain and swelling and can prevent or help relieve symptoms of CPPD, but there is no way to get rid of the crystals. Joint problems caused by crystals of a calcium salt called pyrophosphate may be one of the most misunderstood forms of arthritis. Joint problems seen with these crystals often are mistaken for gout and other conditions. Proper diagnosis (detection) is important. Untreated calcium pyrophosphate deposition (CPPD) may lead to severe, painful attacks or chronic (long-term) pain and inflammation. Over time, joints may degenerate, or break down, resulting in long-term disability. Some treatment options for the arthritis pain do exist, but these do not treat the underlying crystal deposits. Some of the underlying causes are treatable and should be evaluated in people with CPPD (see causes below). What is CPPD? CPPD is a type of arthritis that, as the old name of pseudogout suggests, can cause symptoms similar to gout. Yet in CPPD, a different type of crystal deposit triggers the reaction. CPPD can cause bouts of severe pain and swelling in one or more joints, which can limit activity for days or weeks. It also can cause a more lasting arthritis that mimics osteoarthritis or rheumatoid arthritis. The condition most often involves the knees, but can affect wrists, shoulders, ankles, elbows, hands or other joints. CPPD develops when calcium pyrophosphate crystals build up in a joint.  Crystals out whether calcium pyrophosphate crystals are present. An X-ray of the joint may help detect whether calcium-containing deposits are present in the cartilage. Doctors call this X-ray appearance chondrocalcinosis, which is almost always due to CPPD. Your doctor must rule out other potential causes of symptoms. These include gout, rheumatoid arthritis and joint infection. Your doctor also may do blood tests to check for some of the underlying causes listed above       How is CPPD treated? No treatment is available to dissolve the crystal deposits. For patients who have acute attacks, the doctor may prescribe nonsteroidal anti-inflammatory drugs, which are commonly called NSAIDs. NSAIDs, like indomethacin (Indocin) and naproxen (Naprosyn), treat pain and swelling during severe attacks. People with poor kidney function, who have a history of stomach ulcers and/or who are on blood thinners often cannot take NSAIDs. For these patients, it may help to have your doctor drain the joint fluid and inject a corticosteroid into the affected joint. To try to prevent further attacks, low doses of colchicine (a medicine used more often for gout) or NSAIDs may prove effective. Other medicines may help some patients during severe attacks of calcium pyrophosphate crystal arthritis or with the less common chronic inflammation that these crystals can cause. These drugs include hydroxychloroquine (Plaquenil), methotrexate (Rheumatrex, Trexall, Otrexup, Rasuvo) or an interleukin 1 beta antagonist that can decrease inflammation, such as the biologic medicine anakinra (Kineret). Anakinra is approved by the government for treatment of rheumatoid arthritis. Surgery to repair and replace damaged joints is an option in severe cases    The rheumatologist's role in the treatment of CPPD     Rheumatologists are actively researching the causes of CPPD to better prevent and treat this form of arthritis.  Because people with CPPD tend to be

## 2019-06-13 DIAGNOSIS — Z87.39 H/O CALCIUM PYROPHOSPHATE DEPOSITION DISEASE (CPPD): ICD-10-CM

## 2019-06-13 LAB
FERRITIN: 83.2 NG/ML (ref 30–400)
IRON SATURATION: 30 % (ref 20–50)
IRON: 85 UG/DL (ref 59–158)
TOTAL IRON BINDING CAPACITY: 282 UG/DL (ref 260–445)
TRANSFERRIN: 238 MG/DL (ref 200–360)

## 2019-06-17 LAB
C282Y HEMOCHROMATOSIS MUT: NEGATIVE
H63D HEMOCHROMATOSIS MUT: NEGATIVE
HEMOCHROMATOSIS GENE ANALYSIS: NORMAL
HFE PCR SPECIMEN: NORMAL
S65C HEMOCHROMATOSIS MUT: NEGATIVE

## 2019-06-18 ENCOUNTER — TELEPHONE (OUTPATIENT)
Dept: INTERNAL MEDICINE CLINIC | Age: 64
End: 2019-06-18

## 2019-08-19 ENCOUNTER — TELEPHONE (OUTPATIENT)
Dept: ENDOCRINOLOGY | Age: 64
End: 2019-08-19

## 2019-08-19 NOTE — TELEPHONE ENCOUNTER
I have received a message to be sure to have blood drawn before my appt Wednesday.  What blood work is needed?  I was unable to fill the testosterone prescription you gave me on my last visit. [employer] [facility] wouldnt cover it. Shantele Francy surgery June 21 on my neck and UC forwarded these blood results to [facility]. Thanks. John Rowe message sent by patient through [address], sent patient MyChart response stating message would be forwarded to Dr. Kerri Olguin.

## 2019-08-19 NOTE — TELEPHONE ENCOUNTER
If you did not start taking testosterone it is okay not to have any blood work done and we can discuss it at the appointment what ever needs to be done

## 2019-08-20 ENCOUNTER — OFFICE VISIT (OUTPATIENT)
Dept: RHEUMATOLOGY | Age: 64
End: 2019-08-20
Payer: COMMERCIAL

## 2019-08-20 VITALS
SYSTOLIC BLOOD PRESSURE: 95 MMHG | WEIGHT: 172 LBS | HEART RATE: 69 BPM | BODY MASS INDEX: 28.53 KG/M2 | DIASTOLIC BLOOD PRESSURE: 58 MMHG

## 2019-08-20 DIAGNOSIS — M54.10 RADICULAR PAIN: ICD-10-CM

## 2019-08-20 DIAGNOSIS — Z87.39 H/O CALCIUM PYROPHOSPHATE DEPOSITION DISEASE (CPPD): ICD-10-CM

## 2019-08-20 DIAGNOSIS — R20.2 PARESTHESIA: Primary | ICD-10-CM

## 2019-08-20 DIAGNOSIS — G60.9 IDIOPATHIC PERIPHERAL NEUROPATHY: ICD-10-CM

## 2019-08-20 DIAGNOSIS — Z79.899 HIGH RISK MEDICATION USE: ICD-10-CM

## 2019-08-20 LAB
BASOPHILS ABSOLUTE: 0.1 K/UL (ref 0–0.2)
BASOPHILS RELATIVE PERCENT: 1.2 %
C-REACTIVE PROTEIN: 0.8 MG/L (ref 0–5.1)
EOSINOPHILS ABSOLUTE: 0.1 K/UL (ref 0–0.6)
EOSINOPHILS RELATIVE PERCENT: 1.5 %
HCT VFR BLD CALC: 43.7 % (ref 40.5–52.5)
HEMOGLOBIN: 14.3 G/DL (ref 13.5–17.5)
LYMPHOCYTES ABSOLUTE: 1.4 K/UL (ref 1–5.1)
LYMPHOCYTES RELATIVE PERCENT: 21.1 %
MCH RBC QN AUTO: 31.4 PG (ref 26–34)
MCHC RBC AUTO-ENTMCNC: 32.6 G/DL (ref 31–36)
MCV RBC AUTO: 96.4 FL (ref 80–100)
MONOCYTES ABSOLUTE: 1 K/UL (ref 0–1.3)
MONOCYTES RELATIVE PERCENT: 13.9 %
NEUTROPHILS ABSOLUTE: 4.3 K/UL (ref 1.7–7.7)
NEUTROPHILS RELATIVE PERCENT: 62.3 %
PDW BLD-RTO: 15.9 % (ref 12.4–15.4)
PLATELET # BLD: 380 K/UL (ref 135–450)
PMV BLD AUTO: 8.1 FL (ref 5–10.5)
RBC # BLD: 4.53 M/UL (ref 4.2–5.9)
SEDIMENTATION RATE, ERYTHROCYTE: 8 MM/HR (ref 0–20)
WBC # BLD: 6.8 K/UL (ref 4–11)

## 2019-08-20 PROCEDURE — 99214 OFFICE O/P EST MOD 30 MIN: CPT | Performed by: INTERNAL MEDICINE

## 2019-08-20 PROCEDURE — G8427 DOCREV CUR MEDS BY ELIG CLIN: HCPCS | Performed by: INTERNAL MEDICINE

## 2019-08-20 PROCEDURE — 4040F PNEUMOC VAC/ADMIN/RCVD: CPT | Performed by: INTERNAL MEDICINE

## 2019-08-20 PROCEDURE — G8419 CALC BMI OUT NRM PARAM NOF/U: HCPCS | Performed by: INTERNAL MEDICINE

## 2019-08-20 PROCEDURE — 1036F TOBACCO NON-USER: CPT | Performed by: INTERNAL MEDICINE

## 2019-08-20 PROCEDURE — 1123F ACP DISCUSS/DSCN MKR DOCD: CPT | Performed by: INTERNAL MEDICINE

## 2019-08-20 PROCEDURE — 3017F COLORECTAL CA SCREEN DOC REV: CPT | Performed by: INTERNAL MEDICINE

## 2019-08-20 RX ORDER — HYDROXYCHLOROQUINE SULFATE 200 MG/1
TABLET, FILM COATED ORAL
Qty: 45 TABLET | Refills: 2 | Status: SHIPPED | OUTPATIENT
Start: 2019-08-20 | End: 2019-11-25 | Stop reason: SDUPTHER

## 2019-08-20 RX ORDER — GABAPENTIN 300 MG/1
CAPSULE ORAL
Qty: 360 CAPSULE | Refills: 0 | Status: SHIPPED | OUTPATIENT
Start: 2019-08-20 | End: 2022-02-07

## 2019-08-21 ENCOUNTER — PATIENT MESSAGE (OUTPATIENT)
Dept: ENDOCRINOLOGY | Age: 64
End: 2019-08-21

## 2019-08-21 ENCOUNTER — TELEPHONE (OUTPATIENT)
Dept: ENDOCRINOLOGY | Age: 64
End: 2019-08-21

## 2019-08-21 ENCOUNTER — OFFICE VISIT (OUTPATIENT)
Dept: ENDOCRINOLOGY | Age: 64
End: 2019-08-21
Payer: COMMERCIAL

## 2019-08-21 VITALS
SYSTOLIC BLOOD PRESSURE: 100 MMHG | HEART RATE: 69 BPM | BODY MASS INDEX: 28.66 KG/M2 | OXYGEN SATURATION: 98 % | WEIGHT: 172 LBS | DIASTOLIC BLOOD PRESSURE: 68 MMHG | HEIGHT: 65 IN

## 2019-08-21 DIAGNOSIS — M48.061 SPINAL STENOSIS AT L4-L5 LEVEL: ICD-10-CM

## 2019-08-21 DIAGNOSIS — G20 PARKINSON DISEASE (HCC): ICD-10-CM

## 2019-08-21 DIAGNOSIS — M81.0 AGE-RELATED OSTEOPOROSIS WITHOUT CURRENT PATHOLOGICAL FRACTURE: Primary | ICD-10-CM

## 2019-08-21 DIAGNOSIS — E29.1 HYPOGONADISM IN MALE: Primary | ICD-10-CM

## 2019-08-21 DIAGNOSIS — M19.90 ARTHRITIS: ICD-10-CM

## 2019-08-21 PROCEDURE — 1036F TOBACCO NON-USER: CPT | Performed by: INTERNAL MEDICINE

## 2019-08-21 PROCEDURE — 1123F ACP DISCUSS/DSCN MKR DOCD: CPT | Performed by: INTERNAL MEDICINE

## 2019-08-21 PROCEDURE — G8419 CALC BMI OUT NRM PARAM NOF/U: HCPCS | Performed by: INTERNAL MEDICINE

## 2019-08-21 PROCEDURE — 3017F COLORECTAL CA SCREEN DOC REV: CPT | Performed by: INTERNAL MEDICINE

## 2019-08-21 PROCEDURE — 4040F PNEUMOC VAC/ADMIN/RCVD: CPT | Performed by: INTERNAL MEDICINE

## 2019-08-21 PROCEDURE — 99214 OFFICE O/P EST MOD 30 MIN: CPT | Performed by: INTERNAL MEDICINE

## 2019-08-21 PROCEDURE — G8427 DOCREV CUR MEDS BY ELIG CLIN: HCPCS | Performed by: INTERNAL MEDICINE

## 2019-08-21 RX ORDER — TESTOSTERONE 16.2 MG/G
1 GEL TRANSDERMAL DAILY
Qty: 2 BOTTLE | Refills: 3 | Status: SHIPPED | OUTPATIENT
Start: 2019-08-21 | End: 2019-08-27

## 2019-08-21 NOTE — PROGRESS NOTES
replacement   He has spinal stenosis   He has neck surgery at C spine level Fusion done   He has lower lumbar spine surgery   He has Parkinson and is having trouble walking   He had carpal tunnel surgery done in the past as well    Neurosurgery visits revealed  cervical myelopathy s/p C5-T1 posterior decompression fixation and fusion. . There was concern of hardware failure on cervical x-ray. He was told that he would require revision of these fusion surgeries in future but the quality of bone during these operations were was found to be week and hence was referred for osteoporosis    Family hx of Tariq Babb ,son has Graves and one of his sisters have severe Graves Orbitopathy and required surgical decompression of her eyes    Since last visit patient underwent successful revision of C2-T2 posterior fusion on June 2019 per Dr. Arcadio Humphries    Review of Systems:    I have reviewed the review of system questionnaire filled by the patient .   Patient was advised to contact PCP for non endocrine signs and symptoms     Past Medical History:   Diagnosis Date    Migraine     cluster     Parkinson disease ([address]      Past Surgical History:   Procedure Laterality Date    CARPAL TUNNEL RELEASE      x2 R, x1 L     HERNIA REPAIR      childhood & at 17 (L) side     KNEE ARTHROSCOPY Left     KNEE SURGERY Right     LUMBAR FUSION      NECK SURGERY      x2    NECK SURGERY  06/21/2019    C2-T2 fusion     TONSILLECTOMY        Social History     Socioeconomic History    Marital status:      Spouse name: Not on file    Number of children: Not on file    Years of education: Not on file    Highest education level: Not on file   Occupational History    Not on file   Social Needs    Financial resource strain: Not on file    Food insecurity:     Worry: Not on file     Inability: Not on file    Transportation needs:     Medical: Not on file     Non-medical: Not on file   Tobacco Use    Smoking status: Former Smoker    advised to have repeat testosterone level drawn 1 month after taking the medication    --. Parkinson disease   He has been on medication for years   Stable follows with neurology. Also had Kamille's syndrome and cluster migraine headaches    --Severe  Arthritis  He had Rt  knee replacement in the past  He appears to have significant arthritic complaints probably he will benefit from referral to a rheumatologist as well. Due to numerous arthritic related issues I did give him a referral for rheumatologist    --. Spinal stenosis at L4-L5 level  Recent  Status post decompression fusion  Since last visit patient underwent successful revision of C2-T2 posterior fusion on June 2019 per Dr. Heriberto Craft         Reviewed and/or ordered clinical lab results Yes  Reviewed and/or ordered radiology tests Yes  Reviewed and/or ordered other diagnostic tests Yes  Discussed test results with performing physician Yes  Made a decision to obtain old records Yes  Reviewed and summarized old records Yes      Cathey Goldmann was counseled regarding symptoms of current diagnosis, course and complications of disease if inadequately treated, side effects of medications, diagnosis, treatment options, and prognosis, risks, benefits, complications, and alternatives of treatment, labs, imaging and other studies and treatment targets and goals. He understands instructions and counseling. These diagnosis were discussed and reviewed with the patient including the advantages of drug therapy. He was counseled at this visit on the following: diabetes complication prevention and foot care. Return in about 3 months (around 11/21/2019). Please note that some or all of this report was generated using voice recognition software. Please notify me in case of any questions about the content of this document, as some errors in transcription may have occurred .

## 2019-08-21 NOTE — PATIENT INSTRUCTIONS
light-headed feeling, like you might pass out (may occur within 4 hours after injection);  · pounding heartbeats or fluttering in your chest after using an injection; or  · high levels of calcium in your blood --nausea, vomiting, constipation, muscle weakness, lack of energy, or tired feeling. Common side effects may include:  · nausea;  · joint pain; or  · pain anywhere in your body. This is not a complete list of side effects and others may occur. Call your doctor for medical advice about side effects. You may report side effects to FDA at 0-410-FDA-4326. What other drugs will affect teriparatide? Tell your doctor about all your current medicines and any you start or stop using, especially:  · digoxin, digitalis. This list is not complete. Other drugs may interact with teriparatide, including prescription and over-the-counter medicines, vitamins, and herbal products. Not all possible interactions are listed in this medication guide. Where can I get more information? Your pharmacist can provide more information about teriparatide. Remember, keep this and all other medicines out of the reach of children, never share your medicines with others, and use this medication only for the indication prescribed. Every effort has been made to ensure that the information provided by [address] is accurate, up-to-date, and complete, but no guarantee is made to that effect. Drug information contained herein may be time sensitive. [facility] information has been compiled for use by healthcare practitioners and consumers in the [place] and therefore [employer] does not warrant that uses outside of the [place] are appropriate, unless specifically indicated otherwise. [facility]'s drug information does not endorse drugs, diagnose patients or recommend therapy.  [facility]'s drug information is an informational resource designed to assist licensed healthcare practitioners in caring for their patients and/or to blood pressure, heart attack, stroke, liver disease, bone growth problems, addiction, and mental effects such as aggression and violence. Testosterone injections should be given only by a healthcare professional.  The length of treatment with testosterone injection will depend on the condition being treated. Testosterone will not enhance athletic performance and should not be used for that purpose. While receiving testosterone, you will need frequent blood tests. Testosterone can affect bone growth in boys who are treated for delayed puberty. Bone development may need to be checked with x-rays every 6 months during treatment. What happens if I miss a dose? Call your doctor for instructions if you miss an appointment for your testosterone injection. What happens if I overdose? Since this medicine is given by a healthcare professional in a medical setting, an overdose is unlikely to occur. What should I avoid while receiving testosterone injection? Follow your doctor's instructions about any restrictions on food, beverages, or activity. What are the possible side effects of testosterone injection? Get emergency medical help if you have signs of an allergic reaction:  hives; difficult breathing; swelling of your face, lips, tongue, or throat.   Call your doctor at once if you have:  · nausea or vomiting;  · changes in skin color;  · increased or ongoing erection of the penis;  · impotence, ejaculation problems, decreased amounts of semen, decrease in testicle size;  · painful or difficult urination;  · shortness of breath (even with mild exertion);  · chest pain or pressure, pain spreading to your jaw or shoulder;  · swelling in your ankles or feet, rapid weight gain;  · signs of a blood clot in the lung --chest pain, sudden cough, wheezing, rapid breathing, coughing up blood;  · signs of a blood clot in your leg --pain, swelling, warmth, or redness in one or both legs;  · high levels of calcium in the blood --stomach pain, constipation, increased thirst or urination, muscle pain or weakness, joint pain, confusion, and feeling tired or restless; or  · liver problems --upper stomach pain, itching, loss of appetite, dark urine, hao-colored stools, jaundice (yellowing of the skin or eyes). Women receiving testosterone may develop male characteristics, which could be irreversible if treatment is continued. Call your doctor at once if you notice any of these signs of excess testosterone:  · acne;  · changes in menstrual periods;  · male-pattern hair growth (such as on the chin or chest);  · hoarse or deepened voice; or  · enlarged clitoris. Common side effects (in men or women) may include:  · breast swelling;  · headache, anxiety;  · increased facial or body hair growth, male-pattern baldness;  · increased or decreased interest in sex;  · numbness or tingly feeling; or  · pain or swelling where the medicine was injected. This is not a complete list of side effects and others may occur. Call your doctor for medical advice about side effects. You may report side effects to FDA at 3-615-FDA-9094. What other drugs will affect testosterone injection? Other drugs may interact with testosterone, including prescription and over-the-counter medicines, vitamins, and herbal products. Tell each of your health care providers about all medicines you use now and any medicine you start or stop using. Where can I get more information? Your doctor or pharmacist can provide more information about testosterone injection. Remember, keep this and all other medicines out of the reach of children, never share your medicines with others, and use this medication only for the indication prescribed. Every effort has been made to ensure that the information provided by [address] is accurate, up-to-date, and complete, but no guarantee is made to that effect. Drug information contained herein may be time sensitive.  Diann

## 2019-08-26 RX ORDER — TESTOSTERONE GEL, 1% 10 MG/G
1 GEL TRANSDERMAL DAILY
Qty: 30 PACKAGE | Refills: 3 | Status: SHIPPED | OUTPATIENT
Start: 2019-08-26 | End: 2019-12-16 | Stop reason: SDUPTHER

## 2019-08-26 NOTE — TELEPHONE ENCOUNTER
Please let the patient know that I have called in a prescription with an alternative AndroGel that seems to be covered with his insurance

## 2019-08-27 ENCOUNTER — TELEPHONE (OUTPATIENT)
Dept: ENDOCRINOLOGY | Age: 64
End: 2019-08-27

## 2019-08-27 NOTE — TELEPHONE ENCOUNTER
Voicemail from pt's wife stating that insurance [facility] will need PA for Androgel.  If you need any more info for her or pt # [phone]

## 2019-09-11 ENCOUNTER — TELEPHONE (OUTPATIENT)
Dept: ENDOCRINOLOGY | Age: 64
End: 2019-09-11

## 2019-10-15 DIAGNOSIS — E29.1 HYPOGONADISM IN MALE: ICD-10-CM

## 2019-10-15 DIAGNOSIS — M81.0 AGE-RELATED OSTEOPOROSIS WITHOUT CURRENT PATHOLOGICAL FRACTURE: ICD-10-CM

## 2019-10-15 LAB
HCT VFR BLD CALC: 44.3 % (ref 40.5–52.5)
HEMOGLOBIN: 14.7 G/DL (ref 13.5–17.5)

## 2019-10-17 LAB
SEX HORMONE BINDING GLOBULIN: 61 NMOL/L (ref 11–80)
TESTOSTERONE FREE-NONMALE: 84.9 PG/ML (ref 47–244)
TESTOSTERONE TOTAL: 599 NG/DL (ref 220–1000)

## 2019-11-25 DIAGNOSIS — Z87.39 H/O CALCIUM PYROPHOSPHATE DEPOSITION DISEASE (CPPD): ICD-10-CM

## 2019-11-25 RX ORDER — HYDROXYCHLOROQUINE SULFATE 200 MG/1
TABLET, FILM COATED ORAL
Qty: 45 TABLET | Refills: 2 | Status: SHIPPED | OUTPATIENT
Start: 2019-11-25 | End: 2020-01-09 | Stop reason: SDUPTHER

## 2019-12-16 ENCOUNTER — OFFICE VISIT (OUTPATIENT)
Dept: ENDOCRINOLOGY | Age: 64
End: 2019-12-16
Payer: COMMERCIAL

## 2019-12-16 VITALS
HEIGHT: 65 IN | DIASTOLIC BLOOD PRESSURE: 79 MMHG | SYSTOLIC BLOOD PRESSURE: 133 MMHG | HEART RATE: 88 BPM | WEIGHT: 175 LBS | BODY MASS INDEX: 29.16 KG/M2

## 2019-12-16 DIAGNOSIS — E29.1 HYPOGONADISM IN MALE: ICD-10-CM

## 2019-12-16 DIAGNOSIS — M81.0 AGE-RELATED OSTEOPOROSIS WITHOUT CURRENT PATHOLOGICAL FRACTURE: Primary | ICD-10-CM

## 2019-12-16 DIAGNOSIS — E29.1 HYPOGONADISM MALE: ICD-10-CM

## 2019-12-16 DIAGNOSIS — G20 PARKINSON DISEASE (HCC): ICD-10-CM

## 2019-12-16 PROCEDURE — 1036F TOBACCO NON-USER: CPT | Performed by: INTERNAL MEDICINE

## 2019-12-16 PROCEDURE — 4040F PNEUMOC VAC/ADMIN/RCVD: CPT | Performed by: INTERNAL MEDICINE

## 2019-12-16 PROCEDURE — 1123F ACP DISCUSS/DSCN MKR DOCD: CPT | Performed by: INTERNAL MEDICINE

## 2019-12-16 PROCEDURE — 3017F COLORECTAL CA SCREEN DOC REV: CPT | Performed by: INTERNAL MEDICINE

## 2019-12-16 PROCEDURE — G8484 FLU IMMUNIZE NO ADMIN: HCPCS | Performed by: INTERNAL MEDICINE

## 2019-12-16 PROCEDURE — 99214 OFFICE O/P EST MOD 30 MIN: CPT | Performed by: INTERNAL MEDICINE

## 2019-12-16 PROCEDURE — G8427 DOCREV CUR MEDS BY ELIG CLIN: HCPCS | Performed by: INTERNAL MEDICINE

## 2019-12-16 PROCEDURE — G8417 CALC BMI ABV UP PARAM F/U: HCPCS | Performed by: INTERNAL MEDICINE

## 2019-12-16 RX ORDER — TESTOSTERONE GEL, 1% 10 MG/G
1 GEL TRANSDERMAL DAILY
Qty: 30 PACKAGE | Refills: 3 | Status: SHIPPED | OUTPATIENT
Start: 2019-12-16 | End: 2020-05-18 | Stop reason: SDUPTHER

## 2020-01-06 NOTE — PROGRESS NOTES
[facility]      MD Yarelis  [facility] ([facility]) Physicians  [facility]    Rheumatology Progress Note  SUBJECTIVE:    Background:   Jennie Russell is a 71 y.o. 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 Bhumi Schofield). PMHx pertinent for chronic neck and LBP w/ cervical myelopathy s/p C5-T1 posterior decompression fixation and fusion w/ concern for hardware failure s/p revision C2-T2 posterior fusion on 6/21/19, spinal stenosis and degenerative arthritis of the L-spine w/ unstable L4-5 fusion seen on recent CT, and Parkinson's disease. Current rheum related meds:   mg daily: started in 8/19? Gabapentin 600 mg QID: started in 4/19  Tizanidine 2 mg TID PRN and Meloxicam: [facility]     Prior rheum meds:  Colchicine 0.6 mg daily: took from 4/19 - 8/19, ineffective  Robaxin 750 mg BID    Past medical/surgical history, medications and allergies are reviewed and updated as appropriate. Interval Hx:   Pt reports compliance w/ HCQ. He denies significant joint pain or swelling involving his hands but reports chronic stiffness and difficulty w/ using his hands ralph the R hand for fine motor skills. He is wondering if some of this is d/t his Parkinson's disease. He reports no change to his chronic neck and low back pain. He has established care w/ [facility], Robaxin has been switched to Tizanidine. Gabapentin dose has been increased. He will be undergoing lumbar surgery next month.     Constitutional: +chronic fatigue, denies fever/chills, night sweats, unintentional weight loss  Integumentary: denies photosensitivity, rash, patchy alopecia, or Sx of Raynaud's phenomenon  Eyes: denies dry eyes, redness or pain, visual disturbance, or floaters  Ears: denies hearing loss, tinnitus, vertigo, or recurrent ear infections  Nose: denies nasal ulcers or recurrent sinusitis  Oral cavity: denies dry mouth or oral ulcers  Cardiovascular: denies CP, palpitations, Hx of pericardial effusion or pericarditis  Respiratory: denies SOB, cough, hemoptysis, or pleurisy  Musculoskeletal:  refer to above HPI     No Known Allergies    Past Medical History:        Diagnosis Date    Migraine     cluster     Parkinson disease ([facility] [address]        Past Surgical History:        Procedure Laterality Date    CARPAL TUNNEL RELEASE      x2 R, x1 L     HERNIA REPAIR      childhood & at 17 (L) side     KNEE ARTHROSCOPY Left     KNEE SURGERY Right     LUMBAR FUSION      NECK SURGERY      x2    NECK SURGERY  06/21/2019    C2-T2 fusion     TONSILLECTOMY         Medications:    Current Outpatient Medications   Medication Sig Dispense Refill    meloxicam (MOBIC) 15 MG tablet       tiZANidine (ZANAFLEX) 2 MG tablet       hydroxychloroquine (PLAQUENIL) 200 MG tablet Take one and a half tablets (300 mg) with meal daily 45 tablet 3    predniSONE (DELTASONE) 10 MG tablet Take 2 tablets by mouth daily for 7 days, THEN 1 tablet daily for 7 days. Take taper PRN for arthritis flares. . 21 tablet 2    teriparatide, recombinant, (FORTEO) 600 MCG/2.4ML SOLN injection Inject 0.08 mLs into the skin daily 2.4 mL 11    testosterone (ANDROGEL) 50 MG/5GM (1%) GEL 1% gel Apply 1 Package topically daily for 90 doses. 30 Package 3    gabapentin (NEURONTIN) 300 MG capsule 300 mg in AM and lunch time, 600 mg QHS (Patient taking differently: 600 mg. 300 mg in AM and lunch time, 600 mg QHS) 360 capsule 0    carbidopa-levodopa (SINEMET)  MG per tablet Take 3 tabs five times per day  5    Multiple Vitamin (MULTI VITAMIN MENS PO) Take 1 tablet by mouth Take 1 tab po daily      Cholecalciferol (VITAMIN D PO) Take 50,000 Units by mouth       No current facility-administered medications for this visit.          OBJECTIVE:  Physical Exam:    /80   Pulse 77   Wt 175 lb (79.4 kg)   BMI 29.12 kg/m²     GEN: AAOx3, in NAD, accompanied by wife  HEAD: normocephalic, atraumatic  EYES: EOMI, PERRLA, no injection or icterus  ORAL CAVITY: dry oral mucosa, no oral lesions, no evidence of thrush, no evidence of parotid gland enlargement  CVS: RRR  LUNGS: in no acute respiratory distress, CTAB  MSK:   Spine: there is significant kyphosis of the T-spine, no point tenderness over the spine  Upper extremities:              Hands: chronic bony enlargement w/o synovitis of the b/l PIP joints, R 3rd PIP joint TTP, MCP joints w/o synovitis NTTP, DIP joints w/o synovitis, able to form full fist w/ fair  strength              Wrist: there is extensive bony hypertrophy w/o synovits of the b/l wrist joints (L>R), there is significant fusion of the wrist joints, wrist joints NTTP              Elbow: no synovitis or bursitis, FROM  Lower extremities:              Knees: s/p R TKR, no warmth or effusion present, +crepitus in L knee              Ankles: no synovitis, FROM              Feet: no toe swelling or pain or warmth on palpation w/ FROM, negative MTP squeeze test  NEURO: there is a resting tremor and bradykinesia, CN II-XII grossly intact intact, face appears symmetrical, brisk patellar reflexes b/l, 5/5 strength proximally and distally throughout in both upper and lower extremities, decreased sharp vs. dull sensation most pronounced in R foot radiating up to the R thigh > LLE   INTEGUMENTARY: no rash or psoriatic lesions, no petechiae, bruises, or palpable purpura, no patchy alopecia, no nail or periungual changes, no clubbing or digital ulcers  PSYCH: blunted affect    DATA:  Labs:    I personally reviewed interval labs and discussed w/ the pt in detail which showed:  CBC w/ diff (12/17/19): normal WBC count, normal H/H and plt count  CMP (6/22/19): normal renal and hepatic function    TSH, T4, thyroid peroxidase, anti-thyroglob Ab negative (3/11/19)  PTH wnl (3/11/19)  HgbA1c 6.1 (3/11/19)    Interval labs from 4/4/19:  Negative HLA B27, RF, CCP  Negative hepatitis B and C  Negative SPEP and UPEP    PTH wnl (12/18/18)  TSH wnl (2/25/19)  Serum Mg wnl (6/13/19)  Ferritin and transferrin wnl (6/13/19)  Hemochromatosis genetic test (6/17/19): negative H63D, C282Y, S65C    CRP and ESR wnl (4/4/19, 8/20/19)    Imaging:   I personally reviewed interval imaging and discussed w/ the pt in detail which included:  CT C-spine (3/12/19):  Postoperative findings of multilevel anterior and posterior fusion and laminectomy w/ significant loosening and extrusion of the posterior fusion hardware at C5, C6, and T1. The interbody grafts at C4-5 and C5-6 are subtotally incorporated with minimal subtle questionable lucency around the C4 screws, no definite screw loosening at C5 or C6. Advanced multilevel cervical degenerative disc disease and facet arthrosis causing multilevel neural foraminal narrowing. Other level specific details are discussed above and on the prior MRI report. The cord compression and cord signal abnormality at C6-7 is much better demonstrated on MRI.      X-ray C-spine 8/6/19  IMPRESSION:  Stable postoperative changes from C4-C6 ACDF and C2-T2 posterior fusion. No hardware complications seen.     CT L-spine (3/12/19):  Postoperative findings of L4-S1 fusion again identified with similar appearance of bilateral L4 pedicle screw loosening, with subtle increased anterolisthesis at L4-5. Asymmetric widening of a left facet defect at L4-5 is similar to slightly more evident, with suspected pseudoarthrosis along the medial margin of a previously ankylosed right L4-5 facet joint. Unstable L4-5 fusion is again suspected. Definite loosening of the right L5 pedicle screw is not seen. The L5-S1 fusion appears solid. Multilevel degenerative disc disease and facet stenosis with other level specific details discussed above. The thecal sac is again obscured at the postoperative sites.     X-ray L-spine (12/17/19):   IMPRESSION:  1.  Grade 1 spondylolisthesis at L4-5 and mild retrolisthesis at Endocrinology. Peder Dancer was seen today for follow-up. Diagnoses and all orders for this visit:    H/O calcium pyrophosphate deposition disease (CPPD)  -     hydroxychloroquine (PLAQUENIL) 200 MG tablet; Take one and a half tablets (300 mg) with meal daily  -     predniSONE (DELTASONE) 10 MG tablet; Take 2 tablets by mouth daily for 7 days, THEN 1 tablet daily for 7 days. Take taper PRN for arthritis flares. .    High risk medication use    Encounter for therapeutic drug monitoring          No orders of the defined types were placed in this encounter. Outpatient Encounter Medications as of 1/9/2020   Medication Sig Dispense Refill    meloxicam (MOBIC) 15 MG tablet       tiZANidine (ZANAFLEX) 2 MG tablet       hydroxychloroquine (PLAQUENIL) 200 MG tablet Take one and a half tablets (300 mg) with meal daily 45 tablet 3    predniSONE (DELTASONE) 10 MG tablet Take 2 tablets by mouth daily for 7 days, THEN 1 tablet daily for 7 days. Take taper PRN for arthritis flares. . 21 tablet 2    teriparatide, recombinant, (FORTEO) 600 MCG/2.4ML SOLN injection Inject 0.08 mLs into the skin daily 2.4 mL 11    testosterone (ANDROGEL) 50 MG/5GM (1%) GEL 1% gel Apply 1 Package topically daily for 90 doses. 30 Package 3    gabapentin (NEURONTIN) 300 MG capsule 300 mg in AM and lunch time, 600 mg QHS (Patient taking differently: 600 mg. 300 mg in AM and lunch time, 600 mg QHS) 360 capsule 0    carbidopa-levodopa (SINEMET)  MG per tablet Take 3 tabs five times per day  5    Multiple Vitamin (MULTI VITAMIN MENS PO) Take 1 tablet by mouth Take 1 tab po daily      Cholecalciferol (VITAMIN D PO) Take 50,000 Units by mouth      [DISCONTINUED] gabapentin (NEURONTIN) 600 MG tablet       [DISCONTINUED] hydroxychloroquine (PLAQUENIL) 200 MG tablet Take one and a half tablets (300 mg) with meal daily 45 tablet 2     No facility-administered encounter medications on file as of 1/9/2020.       Return in about 6 months (around 7/9/2020) for lab result discussion and treatment plan, medication monitoring. The risks and benefits of my recommendations, as well as other treatment options, benefits and side effects were discussed with the patient today. Questions were answered. Annamarie Mendoza MD  [address] [place] [address] Physicians  [facility] [place] [facility]  [address] [place] [address] [address]  GZODD:[phone]  ZQQ:[phone]    NOTE: This report is transcribed by using voice recognition software dragon. Every effort is made to ensure accuracy; however, inadvertent computerized  transcription errors may be present.      Kristie Simon MD, 1/9/2020 2:20 PM

## 2020-01-09 ENCOUNTER — OFFICE VISIT (OUTPATIENT)
Dept: RHEUMATOLOGY | Age: 65
End: 2020-01-09
Payer: COMMERCIAL

## 2020-01-09 VITALS
DIASTOLIC BLOOD PRESSURE: 80 MMHG | WEIGHT: 175 LBS | BODY MASS INDEX: 29.12 KG/M2 | SYSTOLIC BLOOD PRESSURE: 112 MMHG | HEART RATE: 77 BPM

## 2020-01-09 PROCEDURE — 4040F PNEUMOC VAC/ADMIN/RCVD: CPT | Performed by: INTERNAL MEDICINE

## 2020-01-09 PROCEDURE — 1036F TOBACCO NON-USER: CPT | Performed by: INTERNAL MEDICINE

## 2020-01-09 PROCEDURE — 3017F COLORECTAL CA SCREEN DOC REV: CPT | Performed by: INTERNAL MEDICINE

## 2020-01-09 PROCEDURE — 1123F ACP DISCUSS/DSCN MKR DOCD: CPT | Performed by: INTERNAL MEDICINE

## 2020-01-09 PROCEDURE — G8417 CALC BMI ABV UP PARAM F/U: HCPCS | Performed by: INTERNAL MEDICINE

## 2020-01-09 PROCEDURE — G8484 FLU IMMUNIZE NO ADMIN: HCPCS | Performed by: INTERNAL MEDICINE

## 2020-01-09 PROCEDURE — G8427 DOCREV CUR MEDS BY ELIG CLIN: HCPCS | Performed by: INTERNAL MEDICINE

## 2020-01-09 PROCEDURE — 99214 OFFICE O/P EST MOD 30 MIN: CPT | Performed by: INTERNAL MEDICINE

## 2020-01-09 RX ORDER — PREDNISONE 10 MG/1
TABLET ORAL
Qty: 21 TABLET | Refills: 2 | Status: SHIPPED | OUTPATIENT
Start: 2020-01-09 | End: 2020-01-23

## 2020-01-09 RX ORDER — HYDROXYCHLOROQUINE SULFATE 200 MG/1
TABLET, FILM COATED ORAL
Qty: 45 TABLET | Refills: 3 | Status: SHIPPED | OUTPATIENT
Start: 2020-01-09 | End: 2020-06-15

## 2020-01-09 RX ORDER — MELOXICAM 15 MG/1
TABLET ORAL
COMMUNITY
Start: 2019-12-17 | End: 2021-02-22

## 2020-01-09 RX ORDER — GABAPENTIN 600 MG/1
TABLET ORAL
COMMUNITY
Start: 2019-12-17 | End: 2020-01-09 | Stop reason: CLARIF

## 2020-01-09 RX ORDER — TIZANIDINE 2 MG/1
4 TABLET ORAL 3 TIMES DAILY
COMMUNITY
Start: 2019-12-22 | End: 2020-08-17

## 2020-01-09 NOTE — TELEPHONE ENCOUNTER
Pt's wife called and is requesting that we send a refill for Forteo to the [facility] instead of [facility].  She gave a # to call [phone] and ask for pharmacist    LOV   12-16-19  FOV  5-18-20

## 2020-05-18 ENCOUNTER — VIRTUAL VISIT (OUTPATIENT)
Dept: ENDOCRINOLOGY | Age: 65
End: 2020-05-18
Payer: COMMERCIAL

## 2020-05-18 PROCEDURE — 99214 OFFICE O/P EST MOD 30 MIN: CPT | Performed by: INTERNAL MEDICINE

## 2020-05-18 RX ORDER — CARBIDOPA AND LEVODOPA 50; 200 MG/1; MG/1
TABLET, EXTENDED RELEASE ORAL
COMMUNITY
Start: 2020-04-13

## 2020-05-18 RX ORDER — TRAMADOL HYDROCHLORIDE 50 MG/1
50 TABLET ORAL EVERY 6 HOURS PRN
COMMUNITY
Start: 2020-05-05 | End: 2020-06-04

## 2020-05-18 RX ORDER — TESTOSTERONE GEL, 1% 10 MG/G
1 GEL TRANSDERMAL DAILY
Qty: 30 PACKAGE | Refills: 3 | Status: SHIPPED | OUTPATIENT
Start: 2020-05-18 | End: 2021-03-02

## 2020-05-18 NOTE — PROGRESS NOTES
Patient is seen here for management of Osteoporosis & hypogonadism. He has been diagnosed with Osteoporosis in 2013 by his orthopedic surgeon Dr. Brian Goldsmith he was started on Fosamax, he recalls it was switched to [facility] [place] monthly, then he needed dental implants so the bisphosphonates were stopped and were never resumed after wards. As he never followed up with the orthopedic doctor again. He also remembers that his dental implant was not successful . He has taken the following medications for his Osteoporosis:FOsamax     He has no history of fracture of long bones of upper or lower legs. He has  history of vertebral fractures T8 after a fall from ladder in 2009( fall 6-8 feet )   . He has approximately 3 inches height loss as compared to his  young age. He has underwent C-spine fusion surgery as well as lumbar spine fusion surgery. He  does  take vitamin D supplements. + MVI. He  has  1-2 daily servings of cheese, milk or yogurt daily. He exercises regularly. He  has no history of long term steroids, chemotherapy, multiple myeloma, prolonged immobilization, liver or kidney disease, malabsorption, organ transplant, or immunosuppressive medications. No history of diabetes. No history of any other agents with adverse effects on bony skeleton. He is not prone to falls and no history of recent falls. No history of rheumatoid arthritis or other autoimmune disorders. positive history of kidney stones in the past once . No history of proximal muscle weakness, thinning of skin, easy bruisability, centripetal accumulation of fat and excessive weight gain recently to suggest Cushing's syndrome. He does not smoke. No excessive alcohol use. There is no parental history of hip fracture. No family history of Osteoporosis. He had dental implants and stopped fosamax a year before that and never resumed it  and there is no upcoming dental surgeries planned.     He has severe OA s/p rt knee replacement disease panel was negative and bone turnover markers were not done although they were ordered. .    Patient was counseled on adequate calcium and Vitamin D intake and on fall precautions to minimize fracture risk. Advised to take a total of 1200 mg of elemental calcium (including diet and supplements). He  was provided with written information on sources of dietary calcium and general advice on maintaining optimal skeletal health. We also discussed the side effects and contraindications of Forteo therapy including increased risk of osteosarcoma in animal studies. Patient has no apparent contraindications to [facility] therapy     ---Hypogonadism with inappropriately normal FSH and LH  In March 2019 patient was noted to have a T level of 446 with a FSH of 9.5 and LH of 8.9  Patient was started on AndroGel and his last  testosterone level was 599 in October 2019 will stay on the current dose  His PSA was within normal limits which was done on April third 2019  Patient was advised to have repeat testosterone level drawn 1 month after taking the medication    --. Parkinson disease   He has been on medication for years   Stable follows with neurology. Also had Kamille's syndrome and cluster migraine headaches    --Severe  Arthritis  Follows Cristobal Rueda, he is now on Plaquenil.     --. Spinal stenosis at L4-L5 level  Status post decompression fusion  Since last visit patient underwent successful revision of C2-T2 posterior fusion on June 2019 per Dr. Chew Counts         Reviewed and/or ordered clinical lab results Yes  Reviewed and/or ordered radiology tests Yes  Reviewed and/or ordered other diagnostic tests Yes  Discussed test results with performing physician Yes  Made a decision to obtain old records Yes  Reviewed and summarized old records Yes      Dalton Perez was counseled regarding symptoms of current diagnosis, course and complications of disease if inadequately treated, side effects of medications, diagnosis,

## 2020-06-15 RX ORDER — HYDROXYCHLOROQUINE SULFATE 200 MG/1
TABLET, FILM COATED ORAL
Qty: 45 TABLET | Refills: 0 | Status: SHIPPED | OUTPATIENT
Start: 2020-06-15 | End: 2020-07-13

## 2020-07-13 RX ORDER — HYDROXYCHLOROQUINE SULFATE 200 MG/1
TABLET, FILM COATED ORAL
Qty: 45 TABLET | Refills: 0 | Status: SHIPPED | OUTPATIENT
Start: 2020-07-13 | End: 2020-08-04 | Stop reason: SDUPTHER

## 2020-08-03 NOTE — PROGRESS NOTES
TELEHEALTH EVALUATION -- Audio/Visual (During USBWD-36 public health emergency)    Pursuant to the emergency declaration under the [facility], [facility] waiver authority and the [employer] Act, this Virtual  Visit was conducted, with patient's consent, to reduce the patient's risk of exposure to COVID-19 and provide continuity of care for an established patient. Services were provided through a video synchronous discussion virtually to substitute for in-person clinic visit. The visit was conducted from the patient's home and my office. RHEUMATOLOGY TELEHEALTH VIDEO VISIT NOTE    SUBJECTIVE:    Background:   Masha Nazario (:  1955) has requested an audio/video evaluation for the following rheumatologic concern(s):    Masha Nazario is a 76 y.o. 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 Zak Lei).  PMHx pertinent for chronic neck and LBP w/ cervical myelopathy s/p C5-T1 posterior decompression fixation and fusion w/ concern for hardware failure s/p revision C2-T2 posterior fusion on 19, spinal stenosis and degenerative arthritis of the L-spine w/ unstable L4-5 fusion s/p lumbar fusion on 20, and Parkinson's disease.     Current rheum related meds:   mg daily: started in ? Gabapentin 600 mg QID: started in   Tramadol PRN  Tizanidine 2 mg TID PRN and Meloxicam: [facility]   Forteo: started in  by Endocrinology     Prior rheum meds:  Colchicine 0.6 mg daily: took from  - , ineffective  Robaxin 750 mg BID  Fosamax, Boniva, d/c'ed d/t dental implants    Past medical/surgical history, medications and allergies are reviewed and updated as appropriate. Interval Hx:   Since his last visit, pt underwent lumbar fusion on 20.   He is depressed to report today that his back pain has not improved, actually worsened after his back surgery. Pt states he cont to use a walker to ambulate. He has been doing home PT during the pandemic and is scheduled to start PT at [facility] in the near future. Current his worst pain is localized to his neck, he remains on Gabapentin, he finds Tramadol no more effective than Tramadol. He has upcoming appt w/ Pain Management and is hopeful to try a more effective pain medicine to control his chronic neck and back pain. He remains on HCQ, states his wrist pain and swelling have resolved. Most recent wrist arthritis attack occurred over 6 months ago. Denies any other peripheral joint involvement, denies any significant morning stiffness.     Rheumatologic ROS:  Constitutional: +chronic fatigue, denies fever/chills, night sweats, unintentional weight loss  Integumentary: denies photosensitivity, rash, patchy alopecia, or Sx of Raynaud's phenomenon  Eyes: denies dry eyes, redness or pain, visual disturbance, or floaters  Ears: denies hearing loss, tinnitus, vertigo, or recurrent ear infections  Nose: denies nasal ulcers or recurrent sinusitis  Oral cavity: denies dry mouth or oral ulcers  Cardiovascular: denies CP, palpitations, Hx of pericardial effusion or pericarditis  Respiratory: denies SOB, cough, hemoptysis, or pleurisy  Musculoskeletal:  refer to above HPI     No Known Allergies    Past Medical History:        Diagnosis Date    Migraine     cluster     Parkinson disease ([facility] [address]        Past Surgical History:        Procedure Laterality Date    CARPAL TUNNEL RELEASE      x2 R, x1 L     HERNIA REPAIR      childhood & at 17 (L) side     KNEE ARTHROSCOPY Left     KNEE SURGERY Right     LUMBAR FUSION      NECK SURGERY      x2    NECK SURGERY  06/21/2019    C2-T2 fusion     TONSILLECTOMY         Medications:    Current Outpatient Medications   Medication Sig Dispense Refill    hydroxychloroquine (PLAQUENIL) 200 MG tablet TAKE 1 AND 1/2 TABLETS BY MOUTH ONE TIME A DAY with meals 45 tablet 3    carbidopa-levodopa (SINEMET CR)  MG per extended release tablet Pt takes 1 tab at night      testosterone (ANDROGEL) 50 MG/5GM (1%) GEL 1% gel Apply 1 Package topically daily for 90 doses. 30 Package 3    teriparatide, recombinant, (FORTEO) 600 MCG/2.4ML SOLN injection Inject 0.08 mLs into the skin daily 2.4 mL 11    tiZANidine (ZANAFLEX) 2 MG tablet 4 mg 3 times daily 1 tablet 3 times a arianna      gabapentin (NEURONTIN) 300 MG capsule 300 mg in AM and lunch time, 600 mg QHS (Patient taking differently: 600 mg. Pt takes 900mg three times a day.) 360 capsule 0    carbidopa-levodopa (SINEMET)  MG per tablet Pt take 4 tabs four times a day. 5    Multiple Vitamin (MULTI VITAMIN MENS PO) Take 1 tablet by mouth Take 1 tab po daily      Cholecalciferol (VITAMIN D PO) Take 50,000 Units by mouth      verapamil (CALAN SR) 120 MG extended release tablet Take 120 mg by mouth 2 times daily      meloxicam (MOBIC) 15 MG tablet        No current facility-administered medications for this visit. OBJECTIVE:    PHYSICAL EXAMINATION:  Due to this being a TeleHealth encounter, evaluation of the following organ systems is limited: Vitals/Constitutional/EENT/Resp/CV/GI//MS/Neuro/Skin/Heme-Lymph-Imm. Constitutional: Normal  Level of distress: Normal  Overall appearance: Normal  Psychiatric: Normal, Appropriate mood and affect. Good insight, good judgment. Orientation: Oriented to time, place, person and situation. Eyes: Vision grossly intact, no visible conjunctival injection  Hearing: Grossly intact  Musculoskeletal: (exam limited by TeleHealth encounter), no visible synovitis of the hand or wrist joints, full fist formation    DATA:  Labs: I personally reviewed interval labs and discussed w/ the pt in detail which showed:    CBC w/ diff (3/2/20):  WBC 13.9, H/H 10.5/31.9, plt count 643  SCr 0.78, GFR >90 (3/2/20)  LFTs wnl (2/28/20)     TSH, T4, thyroid peroxidase, anti-thyroglob Ab negative (3/11/19)  PTH wnl (3/11/19)  HgbA1c 6.1 (3/11/19)     Interval labs from 4/4/19:  Negative HLA B27, RF, CCP  Negative hepatitis B and C  Negative SPEP and UPEP     PTH wnl (12/18/18)  TSH wnl (2/25/19)  Serum Mg wnl (6/13/19)  Ferritin and transferrin wnl (6/13/19)  Hemochromatosis genetic test (6/17/19): negative H63D, C282Y, S65C     CRP and ESR wnl (4/4/19, 8/20/19)    Imaging:  I personally reviewed interval imaging and discussed w/ the pt in detail which included:    CT C-spine (3/12/19):  Postoperative findings of multilevel anterior and posterior fusion and laminectomy w/ significant loosening and extrusion of the posterior fusion hardware at C5, C6, and T1. The interbody grafts at C4-5 and C5-6 are subtotally incorporated with minimal subtle questionable lucency around the C4 screws, no definite screw loosening at C5 or C6. Advanced multilevel cervical degenerative disc disease and facet arthrosis causing multilevel neural foraminal narrowing. Other level specific details are discussed above and on the prior MRI report. The cord compression and cord signal abnormality at C6-7 is much better demonstrated on MRI.      X-ray C-spine 8/6/19  IMPRESSION:  Stable postoperative changes from C4-C6 ACDF and C2-T2 posterior fusion. No hardware complications seen.      CT L-spine (3/12/19):  Postoperative findings of L4-S1 fusion again identified with similar appearance of bilateral L4 pedicle screw loosening, with subtle increased anterolisthesis at L4-5. Asymmetric widening of a left facet defect at L4-5 is similar to slightly more evident, with suspected pseudoarthrosis along the medial margin of a previously ankylosed right L4-5 facet joint. Unstable L4-5 fusion is again suspected. Definite loosening of the right L5 pedicle screw is not seen. The L5-S1 fusion appears solid. Multilevel degenerative disc disease and facet stenosis with other level specific details discussed above.  The thecal sac is again obscured hydroxychloroquine (PLAQUENIL) 200 MG tablet; TAKE 1 AND 1/2 TABLETS BY MOUTH ONE TIME A DAY with meals    High risk medication use  -     CBC Auto Differential; Future    Encounter for therapeutic drug monitoring  -     CBC Auto Differential; Future          Orders Placed This Encounter   Procedures    CBC Auto Differential     Standing Status:   Future     Standing Expiration Date:   2/4/2021     Outpatient Encounter Medications as of 8/4/2020   Medication Sig Dispense Refill    hydroxychloroquine (PLAQUENIL) 200 MG tablet TAKE 1 AND 1/2 TABLETS BY MOUTH ONE TIME A DAY with meals 45 tablet 3    carbidopa-levodopa (SINEMET CR)  MG per extended release tablet Pt takes 1 tab at night      testosterone (ANDROGEL) 50 MG/5GM (1%) GEL 1% gel Apply 1 Package topically daily for 90 doses. 30 Package 3    teriparatide, recombinant, (FORTEO) 600 MCG/2.4ML SOLN injection Inject 0.08 mLs into the skin daily 2.4 mL 11    tiZANidine (ZANAFLEX) 2 MG tablet 4 mg 3 times daily 1 tablet 3 times a arianna      gabapentin (NEURONTIN) 300 MG capsule 300 mg in AM and lunch time, 600 mg QHS (Patient taking differently: 600 mg. Pt takes 900mg three times a day.) 360 capsule 0    carbidopa-levodopa (SINEMET)  MG per tablet Pt take 4 tabs four times a day. 5    Multiple Vitamin (MULTI VITAMIN MENS PO) Take 1 tablet by mouth Take 1 tab po daily      Cholecalciferol (VITAMIN D PO) Take 50,000 Units by mouth      [DISCONTINUED] hydroxychloroquine (PLAQUENIL) 200 MG tablet TAKE 1 AND 1/2 TABLETS BY MOUTH ONE TIME A DAY with meals 45 tablet 0    verapamil (CALAN SR) 120 MG extended release tablet Take 120 mg by mouth 2 times daily      meloxicam (MOBIC) 15 MG tablet        No facility-administered encounter medications on file as of 8/4/2020. Return in about 4 months (around 12/4/2020) for lab result discussion and treatment plan, medication monitoring.     The risks and benefits of my recommendations, as well as

## 2020-08-04 ENCOUNTER — VIRTUAL VISIT (OUTPATIENT)
Dept: RHEUMATOLOGY | Age: 65
End: 2020-08-04
Payer: COMMERCIAL

## 2020-08-04 PROCEDURE — 1036F TOBACCO NON-USER: CPT | Performed by: INTERNAL MEDICINE

## 2020-08-04 PROCEDURE — 3017F COLORECTAL CA SCREEN DOC REV: CPT | Performed by: INTERNAL MEDICINE

## 2020-08-04 PROCEDURE — G8427 DOCREV CUR MEDS BY ELIG CLIN: HCPCS | Performed by: INTERNAL MEDICINE

## 2020-08-04 PROCEDURE — 1123F ACP DISCUSS/DSCN MKR DOCD: CPT | Performed by: INTERNAL MEDICINE

## 2020-08-04 PROCEDURE — 99214 OFFICE O/P EST MOD 30 MIN: CPT | Performed by: INTERNAL MEDICINE

## 2020-08-04 PROCEDURE — G8417 CALC BMI ABV UP PARAM F/U: HCPCS | Performed by: INTERNAL MEDICINE

## 2020-08-04 PROCEDURE — 4040F PNEUMOC VAC/ADMIN/RCVD: CPT | Performed by: INTERNAL MEDICINE

## 2020-08-04 RX ORDER — HYDROXYCHLOROQUINE SULFATE 200 MG/1
TABLET, FILM COATED ORAL
Qty: 45 TABLET | Refills: 3 | Status: SHIPPED | OUTPATIENT
Start: 2020-08-04 | End: 2020-12-01 | Stop reason: SDUPTHER

## 2020-08-17 ENCOUNTER — VIRTUAL VISIT (OUTPATIENT)
Dept: ENDOCRINOLOGY | Age: 65
End: 2020-08-17
Payer: COMMERCIAL

## 2020-08-17 PROCEDURE — 99442 PR PHYS/QHP TELEPHONE EVALUATION 11-20 MIN: CPT | Performed by: INTERNAL MEDICINE

## 2020-08-17 RX ORDER — CYCLOBENZAPRINE HCL 5 MG
TABLET ORAL
COMMUNITY
Start: 2020-08-05

## 2020-08-17 NOTE — PROGRESS NOTES
Patient is seen here for management of Osteoporosis & hypogonadism. He has been diagnosed with Osteoporosis in 2013 by his orthopedic surgeon Dr. Jose Conrad he was started on Fosamax, he recalls it was switched to [facility] [place] monthly, then he needed dental implants so the bisphosphonates were stopped and were never resumed after wards. As he never followed up with the orthopedic doctor again. He also remembers that his dental implant was not successful . He has taken the following medications for his Osteoporosis:FOsamax     He has no history of fracture of long bones of upper or lower legs. He has  history of vertebral fractures T8 after a fall from ladder in 2009( fall 6-8 feet )   . He has approximately 3 inches height loss as compared to his  young age. He has underwent C-spine fusion surgery as well as lumbar spine fusion surgery. He  does  take vitamin D supplements. + MVI. He  has  1-2 daily servings of cheese, milk or yogurt daily. He exercises regularly. He  has no history of long term steroids, chemotherapy, multiple myeloma, prolonged immobilization, liver or kidney disease, malabsorption, organ transplant, or immunosuppressive medications. No history of diabetes. No history of any other agents with adverse effects on bony skeleton. He is not prone to falls and no history of recent falls. No history of rheumatoid arthritis or other autoimmune disorders. positive history of kidney stones in the past once . No history of proximal muscle weakness, thinning of skin, easy bruisability, centripetal accumulation of fat and excessive weight gain recently to suggest Cushing's syndrome. He does not smoke. No excessive alcohol use. There is no parental history of hip fracture. No family history of Osteoporosis. He had dental implants and stopped fosamax a year before that and never resumed it  and there is no upcoming dental surgeries planned.     He has severe OA s/p rt knee replacement He has spinal stenosis   He has neck surgery at C spine level Fusion done   He has lower lumbar spine surgery   Also diagnosed with  Parkinson and is having trouble walking   He had carpal tunnel surgery done in the past as well    Neurosurgery visits revealed  cervical myelopathy s/p C5-T1 posterior decompression fixation and fusion. . There was concern of hardware failure on cervical x-ray. He was told that he would require revision of these fusion surgeries in future but the quality of bone during these operations were was found to be week and hence was referred for osteoporosis    Family hx of Shanti Cross ,son has Graves and one of his sisters have severe Graves Orbitopathy and required surgical decompression of her eyes patient's thyroid receptor antibodies were negative and thyroid fx test are within normal limits     --- patient underwent successful revision of C2-T2 posterior fusion on June 2019 per Dr. Christel Turner    Interim history  Aug 2020 , patient started  Forteo injections v [place] Luther 2019 has not noticed any side effects from the medication denies any recent fractures denies any kidney stones.   He has been using testosterone gel and has noted some improvement in his fatigue but could not tell a significant difference in his symptoms  He had back surgery in feb 2020 with some residual back pain ---still using a walker   No new complaints         Past Medical History:   Diagnosis Date    Migraine     cluster     Parkinson disease ([facility] [address]      Past Surgical History:   Procedure Laterality Date    CARPAL TUNNEL RELEASE      x2 R, x1 L     HERNIA REPAIR      childhood & at 17 (L) side     KNEE ARTHROSCOPY Left     KNEE SURGERY Right     LUMBAR FUSION      NECK SURGERY      x2    NECK SURGERY  06/21/2019    C2-T2 fusion     TONSILLECTOMY        Social History     Socioeconomic History    Marital status:      Spouse name: Not on file    Number of children: Not on file    Years of education: Not on file    Highest education level: Not on file   Occupational History    Not on file   Social Needs    Financial resource strain: Not on file    Food insecurity     Worry: Not on file     Inability: Not on file    Transportation needs     Medical: Not on file     Non-medical: Not on file   Tobacco Use    Smoking status: Former Smoker    Smokeless tobacco: Never Used   Substance and Sexual Activity    Alcohol use: Never     Frequency: Never    Drug use: Never    Sexual activity: Not on file   Lifestyle    Physical activity     Days per week: Not on file     Minutes per session: Not on file    Stress: Not on file   Relationships    Social connections     Talks on phone: Not on file     Gets together: Not on file     Attends Voodoo service: Not on file     Active member of club or organization: Not on file     Attends meetings of clubs or organizations: Not on file     Relationship status: Not on file    Intimate partner violence     Fear of current or ex partner: Not on file     Emotionally abused: Not on file     Physically abused: Not on file     Forced sexual activity: Not on file   Other Topics Concern    Not on file   Social History Narrative    Not on file     Family History   Problem Relation Age of Onset    Osteoporosis Mother     Cancer Father         skin     Prostate Cancer Brother 77     Prior to Admission medications    Medication Sig Start Date End Date Taking?  Authorizing Provider   cyclobenzaprine (FLEXERIL) 5 MG tablet TAKE 1 TABLET BY MOUTH THREE TIMES A DAY AS NEEDED FOR MUSCLE SPASM 8/5/20  Yes Historical Provider, MD   hydroxychloroquine (PLAQUENIL) 200 MG tablet TAKE 1 AND 1/2 TABLETS BY MOUTH ONE TIME A DAY with meals 8/4/20  Yes David Mullins MD   carbidopa-levodopa (SINEMET CR)  MG per extended release tablet Pt takes 1 tab at night 4/13/20  Yes Historical Provider, MD   testosterone (ANDROGEL) 50 MG/5GM (1%) GEL 1% gel Apply 1 Package topically daily for are normal  Skin: skin and subcutaneous tissue is normal without visible mass,   Head and Face: visual inspection  of head and face revealed no abnormalities  Eyes: visual inspection showed no lid or conjunctival swelling, erythema or discharge, pupils are normal, equal, round  Ears/Nose: external inspection of ears and nose revealed no abnormalities, hearing is grossly normal  Oropharynx/Mouth/Face: lips, tongue and gums appear  normal with no lesions, the voice quality was normal  Neck: neck appears symmetric, with no visible masses,   Pulmonary: no increased work of breathing or signs of respiratory distress,  Musculoskeletal: normal on inspection    Neurological: normal coordination and normal general cortical function        Assessment/Plan:    --- Age-related osteoporosis  Started forteo sept 2019 takes daily   We will repeat DEXA scan in 6 months   Today we discussed that after stopping  Forteo he will start antiresorptive's at that time patient had already taken Fosamax for approximately 2 to 3 years in the past he finds it hard to take it weekly and would like an injectable we will make that decision at 5 to 6 months after reviewing his DEXA scan. Last DEXA scan was in March of 2019 done at [facility] which shows lumbar spine T score of -1.3, left hip T score of 0.2 which is within normal limits, right femoral neck has a T score of -1.6 which is osteopenic. --- Patient has  prior history of thoracic spine compression fracture. --- his 24-hr urine calcium and creatinine was normal at 330 for on March 2019. Thyroid function test as well as parathyroid hormone levels were also within normal limits . His celiac disease panel was negative and bone turnover markers were not done although they were ordered. .    Patient was counseled on adequate calcium and Vitamin D intake and on fall precautions to minimize fracture risk.    ---Advised to take a total of 1200 mg of elemental calcium (including diet and supplements). He  was provided with written information on sources of dietary calcium and general advice on maintaining optimal skeletal health. We also discussed the side effects and contraindications of Forteo therapy including increased risk of osteosarcoma in animal studies. Patient has no apparent contraindications to [facility] therapy     ---Hypogonadism with inappropriately normal FSH and LH  In March 2019 patient was noted to have a T level of 446 with a FSH of 9.5 and LH of 8.9  Patient was started on AndroGel and his last  testosterone level was 434 in aug 2020   will stay on the current dose  His PSA was within normal limits which was done on April third 2019  PSa ordered in dec 2019   Patient was advised to have repeat testosterone level drawn 1 month after taking the medication    --. Parkinson disease   He has been on medication for years   Stable follows with neurology. Also had Kamille's syndrome and cluster migraine headaches    --Severe  Arthritis  Follows Cristobal Lucas, he is now on Plaquenil. --. Spinal stenosis at L4-L5 level  Status post decompression fusion  Since last visit patient underwent successful revision of C2-T2 posterior fusion on June 2019 per Dr. Gerrie Epley         Reviewed and/or ordered clinical lab results Yes  Reviewed and/or ordered radiology tests Yes  Reviewed and/or ordered other diagnostic tests Yes  Discussed test results with performing physician Yes  Made a decision to obtain old records Yes  Reviewed and summarized old records Yes      Morgan Dial was counseled regarding symptoms of current diagnosis, course and complications of disease if inadequately treated, side effects of medications, diagnosis, treatment options, and prognosis, risks, benefits, complications, and alternatives of treatment, labs, imaging and other studies and treatment targets and goals. He understands instructions and counseling.     These diagnosis were discussed and reviewed with the patient including the advantages of drug therapy. He was counseled at this visit on the following: diabetes complication prevention and foot care. This was a phone conversation in [place] of in-person visit due to coronavirus emergency. Patient provided verbal consent for an \"over the phone visit'. Part of the visit was conducted on the phone as patient had poor connection on the video. I spent 19  minutes on this call conducting an interview, performing a limited exam by phone and educating the patient on my assessment and plan. Return in about 6 months (around 2/17/2021). Please note that some or all of this report was generated using voice recognition software. Please notify me in case of any questions about the content of this document, as some errors in transcription may have occurred .

## 2020-09-24 ENCOUNTER — TELEPHONE (OUTPATIENT)
Dept: ENDOCRINOLOGY | Age: 65
End: 2020-09-24

## 2020-09-28 ENCOUNTER — OFFICE VISIT (OUTPATIENT)
Dept: PRIMARY CARE CLINIC | Age: 65
End: 2020-09-28
Payer: COMMERCIAL

## 2020-09-28 PROCEDURE — G8417 CALC BMI ABV UP PARAM F/U: HCPCS | Performed by: NURSE PRACTITIONER

## 2020-09-28 PROCEDURE — 99211 OFF/OP EST MAY X REQ PHY/QHP: CPT | Performed by: NURSE PRACTITIONER

## 2020-09-28 PROCEDURE — G8428 CUR MEDS NOT DOCUMENT: HCPCS | Performed by: NURSE PRACTITIONER

## 2020-09-29 LAB — SARS-COV-2, NAA: NOT DETECTED

## 2020-10-01 ENCOUNTER — ANESTHESIA EVENT (OUTPATIENT)
Dept: ENDOSCOPY | Age: 65
End: 2020-10-01
Payer: COMMERCIAL

## 2020-10-02 ENCOUNTER — ANESTHESIA (OUTPATIENT)
Dept: ENDOSCOPY | Age: 65
End: 2020-10-02
Payer: COMMERCIAL

## 2020-10-02 ENCOUNTER — HOSPITAL ENCOUNTER (OUTPATIENT)
Age: 65
Setting detail: OUTPATIENT SURGERY
Discharge: HOME | End: 2020-10-02
Attending: INTERNAL MEDICINE | Admitting: INTERNAL MEDICINE
Payer: COMMERCIAL

## 2020-10-02 VITALS — OXYGEN SATURATION: 100 % | SYSTOLIC BLOOD PRESSURE: 107 MMHG | DIASTOLIC BLOOD PRESSURE: 73 MMHG

## 2020-10-02 VITALS
BODY MASS INDEX: 28.04 KG/M2 | OXYGEN SATURATION: 98 % | HEART RATE: 83 BPM | HEIGHT: 68 IN | WEIGHT: 185 LBS | DIASTOLIC BLOOD PRESSURE: 84 MMHG | RESPIRATION RATE: 20 BRPM | SYSTOLIC BLOOD PRESSURE: 144 MMHG | TEMPERATURE: 97.8 F

## 2020-10-02 PROCEDURE — 6360000002 HC RX W HCPCS: Performed by: NURSE ANESTHETIST, CERTIFIED REGISTERED

## 2020-10-02 PROCEDURE — 7100000010 HC PHASE II RECOVERY - FIRST 15 MIN: Performed by: INTERNAL MEDICINE

## 2020-10-02 PROCEDURE — 3700000000 HC ANESTHESIA ATTENDED CARE: Performed by: INTERNAL MEDICINE

## 2020-10-02 PROCEDURE — 3609010600 HC COLONOSCOPY POLYPECTOMY SNARE/COLD BIOPSY: Performed by: INTERNAL MEDICINE

## 2020-10-02 PROCEDURE — 2709999900 HC NON-CHARGEABLE SUPPLY: Performed by: INTERNAL MEDICINE

## 2020-10-02 PROCEDURE — 3700000001 HC ADD 15 MINUTES (ANESTHESIA): Performed by: INTERNAL MEDICINE

## 2020-10-02 PROCEDURE — 2580000003 HC RX 258: Performed by: ANESTHESIOLOGY

## 2020-10-02 PROCEDURE — 7100000011 HC PHASE II RECOVERY - ADDTL 15 MIN: Performed by: INTERNAL MEDICINE

## 2020-10-02 PROCEDURE — 88305 TISSUE EXAM BY PATHOLOGIST: CPT

## 2020-10-02 RX ORDER — PROPOFOL 10 MG/ML
INJECTION, EMULSION INTRAVENOUS PRN
Status: DISCONTINUED | OUTPATIENT
Start: 2020-10-02 | End: 2020-10-02 | Stop reason: SDUPTHER

## 2020-10-02 RX ORDER — SODIUM CHLORIDE, SODIUM LACTATE, POTASSIUM CHLORIDE, CALCIUM CHLORIDE 600; 310; 30; 20 MG/100ML; MG/100ML; MG/100ML; MG/100ML
INJECTION, SOLUTION INTRAVENOUS CONTINUOUS
Status: DISCONTINUED | OUTPATIENT
Start: 2020-10-02 | End: 2020-10-02 | Stop reason: HOSPADM

## 2020-10-02 RX ORDER — SODIUM CHLORIDE 0.9 % (FLUSH) 0.9 %
10 SYRINGE (ML) INJECTION EVERY 12 HOURS SCHEDULED
Status: DISCONTINUED | OUTPATIENT
Start: 2020-10-02 | End: 2020-10-02 | Stop reason: HOSPADM

## 2020-10-02 RX ORDER — LIDOCAINE HYDROCHLORIDE 20 MG/ML
INJECTION, SOLUTION INTRAVENOUS PRN
Status: DISCONTINUED | OUTPATIENT
Start: 2020-10-02 | End: 2020-10-02 | Stop reason: SDUPTHER

## 2020-10-02 RX ORDER — PROPOFOL 10 MG/ML
INJECTION, EMULSION INTRAVENOUS CONTINUOUS PRN
Status: DISCONTINUED | OUTPATIENT
Start: 2020-10-02 | End: 2020-10-02 | Stop reason: SDUPTHER

## 2020-10-02 RX ORDER — LIDOCAINE HYDROCHLORIDE 10 MG/ML
1 INJECTION, SOLUTION EPIDURAL; INFILTRATION; INTRACAUDAL; PERINEURAL
Status: DISCONTINUED | OUTPATIENT
Start: 2020-10-02 | End: 2020-10-02 | Stop reason: HOSPADM

## 2020-10-02 RX ORDER — SODIUM CHLORIDE 0.9 % (FLUSH) 0.9 %
10 SYRINGE (ML) INJECTION PRN
Status: DISCONTINUED | OUTPATIENT
Start: 2020-10-02 | End: 2020-10-02 | Stop reason: HOSPADM

## 2020-10-02 RX ADMIN — LIDOCAINE HYDROCHLORIDE 100 MG: 20 INJECTION, SOLUTION INTRAVENOUS at 13:13

## 2020-10-02 RX ADMIN — PROPOFOL 120 MCG/KG/MIN: 10 INJECTION, EMULSION INTRAVENOUS at 13:13

## 2020-10-02 RX ADMIN — PROPOFOL 30 MG: 10 INJECTION, EMULSION INTRAVENOUS at 13:17

## 2020-10-02 RX ADMIN — PROPOFOL 50 MG: 10 INJECTION, EMULSION INTRAVENOUS at 13:13

## 2020-10-02 RX ADMIN — PROPOFOL 30 MG: 10 INJECTION, EMULSION INTRAVENOUS at 13:49

## 2020-10-02 RX ADMIN — SODIUM CHLORIDE, POTASSIUM CHLORIDE, SODIUM LACTATE AND CALCIUM CHLORIDE: 600; 310; 30; 20 INJECTION, SOLUTION INTRAVENOUS at 12:21

## 2020-10-02 ASSESSMENT — PULMONARY FUNCTION TESTS
PIF_VALUE: 0
PIF_VALUE: 1
PIF_VALUE: 0
PIF_VALUE: 1
PIF_VALUE: 0

## 2020-10-02 ASSESSMENT — PAIN SCALES - GENERAL
PAINLEVEL_OUTOF10: 0

## 2020-10-02 NOTE — PROGRESS NOTES
Wife made aware of what Dr Boyce Gosselin said aware to patient  Aware to call in 1 week for biopsy results

## 2020-10-02 NOTE — PROCEDURES
[facility]  Colonoscopy REPORT    Patient:  Luciana Brewer                  1955    Endoscopist: SHLOMO Butts     Indication:  12-14 adenomas/SSP     Medications:  MAC    Pre-Anesthesia Assessment:  I have reviewed and am in agreement with patient history and medication, including previous response to sedation. Prior to the procedure, a History and Physical was performed, and patient medications and allergies were reviewed. The risks and benefits of the procedure and the sedation options and risks were discussed with the patient. Complications included but were not limited to infection, bleeding, perforation, death, and missed lesions. All questions were answered and informed consent was obtained by Dr Nessa Curry. Patient identification and proposed procedure were verified by the physician and the nurse in the pre-procedure area and in the procedure room. [place]atti: 3  ASA Grade Assessment: 3    After reviewing the risks and benefits, the patient was deemed in satisfactory condition to undergo the procedure. The anesthesia plan was to use MAC sedation. Immediately prior to administration of medications, the patient was re-assessed for adequacy to receive sedatives and a time out was performed. Patient and healthcare providers were in agreement it was the correct patient and procedure. The heart rate, respiratory rate, oxygen saturations, blood pressure, adequacy of pulmonary ventilation, and response to care were monitored throughout the procedure. The physical status of the patient was re-assessed after the procedure. After adequate sedation was achieved in stepwise fashion a rectal examination was performed and showed no masses. The pediatric colonoscope was then advanced atraumatically into the rectum and advanced without difficulty to the cecum. The cecum was identified by the appendiceal orifice the ileocecal valve and other normal anatomy and a picture was obtained. The scope was then withdrawn (>6minutes) with close inspection of the mucosa in a circumferential manor. 6 mm polyp on ICV, cold snared. Possible very sessile polyp along 1/3 of fold 1 fold distal and on same side as ICV, corner snared 1 other biopsy done, not removed as not clear if it was a true polyp. 2, 7-10 mm mid ascending polyps, cold snared. 7 mm transverse polyp, cold snared. Cluster of polyps 8 mm just above dentate line on retroflexed view, cold snared. Moderate internal hemorrhoids and prominent anal papillae. No immediate complications. The preparation was good. Estimated blood loss trace    Impression:  6 mm polyp on ICV, cold snared  Possible very sessile polyp along 1/3 of fold 1 fold distal and on same side as ICV, corner snared 1 other biopsy done, not removed as not clear if it was a true polyp  2, 7-10 mm mid ascending polyps, cold snared  7 mm transverse polyp, cold snared  Cluster of polyps 8 mm just above dentate line on retroflexed view, cold snared Moderate internal hemorrhoids and prominent anal papillae     Plan:  The patient is aware it is their responsibility to call for biopsy results in 7 days.   Repeat colonoscopy pending path if polyp just distal to ICV is adenoma or serrated will need EMR technique to remove

## 2020-10-02 NOTE — H&P
allergic reaction to anesthesia:  No      PHYSICAL EXAM:      BP (!) 153/99   Pulse 75   Temp 96.5 °F (35.8 °C) (Temporal)   Resp 18   Ht 5' 8\" (1.727 m)   Wt 185 lb (83.9 kg)   SpO2 99%   BMI 28.13 kg/m²  I        Heart:  No m/r/g +s1/s2 RRR    Lungs:  CTA bilaterally    Abdomen:  Soft, nontender, non distended; +bs    ASA Grade:  ASA 3 - Patient with moderate systemic disease with functional limitations    Mallampati Class: 3      ASSESSMENT AND PLAN:    1. Patient is a 76 y.o. male here for colonoscopy with deep sedation  2. Procedure options, risks and benefits reviewed with patient. We specifically discussed that risks include, but are not limited to infection, bleeding, perforation, death, and missed lesions. Patient expresses understanding.

## 2020-10-02 NOTE — PROGRESS NOTES
1404 Arrived in [facility] lying on left side. Advised to pass air. Opens eyes to name. Soft abdomen. 80 Dr Everett  in to speak to him. Passing air. 1418 Taking soda. Lying on left side passing air. 1425 Wife at bedside. Discharge instructions reviewed. Patient and wife (at bedside)  educated, using the teach back method, about follow up instructions and discharge instructions. A completed copy of the AVS instructions given to patient and all questions answered. He denies pain. Passing air. 1450 He assisted minimally with dressing. Transferred to [facility] with help.he stood without help. Noted anterior chest and lower legs with a lightly franchesca skin color. No swelling or itching or SOB. BP improved from this AM and within 20% of pre op. 1500 Wheeled to car and he assisted with transfer to car.  Wife driving

## 2020-10-02 NOTE — ANESTHESIA POSTPROCEDURE EVALUATION
Department of Anesthesiology  Postprocedure Note    Patient: Allan Tavera  MRN: [id number]  YOB: 1955  Date of evaluation: 10/2/2020  Time:  3:56 PM     Procedure Summary     Date:  10/02/20 Room / Location:  [address] [place] [address] [facility]    Anesthesia Start:  4196 Anesthesia Stop:  7084    Procedure:  COLONOSCOPY POLYPECTOMY SNARE/COLD BIOPSY (N/A ) Diagnosis:       Hx of colonic polyps      (Hx of colonic polyps [Z86.010])    Surgeon:  Andrés Quinones MD Responsible Provider:  Jose Elias Baca MD    Anesthesia Type:  general ASA Status:  3          Anesthesia Type: general    Lorena Phase I: Lorena Score: 10    Lorena Phase II: Lorena Score: 10    Last vitals: Reviewed and per EMR flowsheets.        Anesthesia Post Evaluation    Patient location during evaluation: PACU  Patient participation: complete - patient participated  Level of consciousness: awake and alert  Airway patency: patent  Nausea & Vomiting: no nausea and no vomiting  Cardiovascular status: blood pressure returned to baseline  Respiratory status: acceptable  Hydration status: euvolemic

## 2020-10-02 NOTE — ANESTHESIA PRE PROCEDURE
Department of Anesthesiology  Preprocedure Note       Name:  Serafin Paris   Age:  76 y.o.  :  1955                                          MRN:  [id number]         Date:  10/2/2020      Surgeon: Chris Francis):  Patricio Cruz MD    Procedure: Procedure(s):  COLONOSCOPY    Medications prior to admission:   Prior to Admission medications    Medication Sig Start Date End Date Taking? Authorizing Provider   cyclobenzaprine (FLEXERIL) 5 MG tablet TAKE 1 TABLET BY MOUTH THREE TIMES A DAY AS NEEDED FOR MUSCLE SPASM 20  Yes Historical Provider, MD   hydroxychloroquine (PLAQUENIL) 200 MG tablet TAKE 1 AND 1/2 TABLETS BY MOUTH ONE TIME A DAY with meals 20  Yes Bruno Goodrich MD   carbidopa-levodopa (SINEMET CR)  MG per extended release tablet Pt takes 1 tab at night 20  Yes Historical Provider, MD   teriparatide, recombinant, (FORTEO) 600 MCG/2.4ML SOLN injection Inject 0.08 mLs into the skin daily 1/15/20  Yes Angelina Bourgeois MD   carbidopa-levodopa (SINEMET)  MG per tablet Pt take 4 tabs four times a day. 19  Yes Historical Provider, MD   testosterone (ANDROGEL) 50 MG/5GM (1%) GEL 1% gel Apply 1 Package topically daily for 90 doses. 20  Angelina Bourgeois MD   meloxicam (MOBIC) 15 MG tablet  19   Historical Provider, MD   gabapentin (NEURONTIN) 300 MG capsule 300 mg in AM and lunch time, 600 mg QHS  Patient taking differently: 600 mg.  Pt takes 600 mg 3 times daily 19  Bruno Goodrich MD   Multiple Vitamin (MULTI VITAMIN MENS PO) Take 1 tablet by mouth Take 1 tab po daily 10/8/08   Historical Provider, MD   Cholecalciferol (VITAMIN D PO) Take 50,000 Units by mouth 18   Historical Provider, MD       Current medications:    Current Facility-Administered Medications   Medication Dose Route Frequency Provider Last Rate Last Dose    lactated ringers infusion   Intravenous Continuous Yakelin James  mL/hr at 10/02/20 1221      sodium chloride mass index is 28.13 kg/m². CBC:   Lab Results   Component Value Date    WBC 6.8 08/20/2019    RBC 4.53 08/20/2019    HGB 14.8 08/05/2020    HCT 44.5 08/05/2020    MCV 96.5 08/05/2020    RDW 15.4 08/05/2020     08/20/2019       CMP:   Lab Results   Component Value Date     08/05/2020    K 4.2 08/05/2020     08/05/2020    CO2 27 08/05/2020    BUN 16 08/05/2020    CREATININE 0.97 08/05/2020    GFRAA >60 03/11/2019    AGRATIO 1.4 03/11/2019    LABGLOM >60 03/11/2019    GLUCOSE 94 08/05/2020    PROT 6.8 08/05/2020    CALCIUM 9.4 08/05/2020    BILITOT 0.5 08/05/2020    ALKPHOS 69 08/05/2020    AST 15 08/05/2020    ALT 7 08/05/2020       POC Tests: No results for input(s): POCGLU, POCNA, POCK, POCCL, POCBUN, POCHEMO, POCHCT in the last 72 hours. Coags: No results found for: PROTIME, INR, APTT    HCG (If Applicable): No results found for: PREGTESTUR, PREGSERUM, HCG, HCGQUANT     ABGs: No results found for: PHART, PO2ART, KTZ9JJC, DOB8RND, BEART, U8COGAQG     Type & Screen (If Applicable):  No results found for: LABABO, LABRH    Drug/Infectious Status (If Applicable):  No results found for: HIV, HEPCAB    COVID-19 Screening (If Applicable):   Lab Results   Component Value Date    COVID19 NOT DETECTED 09/28/2020         Anesthesia Evaluation   no history of anesthetic complications:   Airway: Mallampati: IV  TM distance: >3 FB   Neck ROM: full  Mouth opening: < 3 FB Dental:          Pulmonary:       (-) asthma and sleep apnea                           Cardiovascular:  Exercise tolerance: poor (<4 METS),   (+) hypertension:,     (-) past MI and  angina                Neuro/Psych:   (+) neuromuscular disease:, headaches:,             GI/Hepatic/Renal:   (+) GERD:,           Endo/Other:        (-) diabetes mellitus               Abdominal:           Vascular:                                        Anesthesia Plan      general     ASA 3     (-npo MN  -denies chest pain or palp.   Unable to walk at all due to left knee pain and severe back pain. Uses walker if possible to only take 4-5 steps)  Induction: intravenous. Anesthetic plan and risks discussed with patient. Plan discussed with CRNA.                   Kim Murillo MD   10/2/2020

## 2020-11-29 NOTE — PROGRESS NOTES
TELEHEALTH EVALUATION -- Audio/Visual (During ZQZME-61 public health emergency)    Pursuant to the emergency declaration under the [address] [place] [address], [facility] waiver authority and the [employer] Act, this Virtual  Visit was conducted, with patient's consent, to reduce the patient's risk of exposure to COVID-19 and provide continuity of care for an established patient. Services were provided through a video synchronous discussion virtually to substitute for in-person clinic visit. The visit was conducted from the patient's home and my office. RHEUMATOLOGY TELEHEALTH VIDEO VISIT NOTE    SUBJECTIVE:    Background:   Clifford Downey (:  1955) has requested an audio/video evaluation for the following rheumatologic concern(s):    Clifford Downey is a 76 y.o. 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 Cris Caldwell).  PMHx pertinent for chronic neck and LBP w/ cervical myelopathy s/p C5-T1 posterior decompression fixation and fusion w/ concern for hardware failure s/p revision C2-T2 posterior fusion on 19, spinal stenosis and degenerative arthritis of the L-spine w/ unstable L4-5 fusion s/p lumbar fusion on 20, and Parkinson's disease.     Current rheum related meds:   mg daily: started in   Gabapentin and Flexeril per  Pain Management  Forteo: started in  by Endocrinology     Prior rheum meds:  Colchicine 0.6 mg daily: took from  - , ineffective  Robaxin 750 mg BID  Fosamax, Boniva, d/c'ed d/t dental implants    Past medical/surgical history, medications and allergies are reviewed and updated as appropriate. Interval Hx:   Pt states he remains on HCQ. He denies any wrist joint pain, swelling or morning stiffness. Denies any other peripheral joint involvement including his hand joints.   He reports no significant change to his chronic pain worst on the R side of his neck and L side of his lower back. He is getting botox injections and following w/ [facility]. Rheumatologic ROS:  Constitutional: +chronic fatigue, denies fever/chills, night sweats, unintentional weight loss  Integumentary: denies photosensitivity, rash, patchy alopecia, or Sx of Raynaud's phenomenon  Cardiovascular: denies CP, palpitations, Hx of pericardial effusion or pericarditis  Respiratory: denies SOB, cough, hemoptysis, or pleurisy  Musculoskeletal:  refer to above HPI     No Known Allergies    Past Medical History:        Diagnosis Date    Migraine     cluster     Parkinson disease ([facility] [address]        Past Surgical History:        Procedure Laterality Date    CARPAL TUNNEL RELEASE      x2 R, x1 L     COLONOSCOPY N/A 10/2/2020    COLONOSCOPY POLYPECTOMY SNARE/COLD BIOPSY performed by Pascual Arnett [facility] at [facility] 54      childhood & at 17 (L) side     KNEE ARTHROSCOPY Left     KNEE SURGERY Right     LUMBAR FUSION      NECK SURGERY      x2    NECK SURGERY  06/21/2019    C2-T2 fusion     TONSILLECTOMY         Medications:    Current Outpatient Medications   Medication Sig Dispense Refill    hydroxychloroquine (PLAQUENIL) 200 MG tablet Take 1 tablet by mouth 2 times daily 180 tablet 1    cyclobenzaprine (FLEXERIL) 5 MG tablet TAKE 1 TABLET BY MOUTH THREE TIMES A DAY AS NEEDED FOR MUSCLE SPASM      carbidopa-levodopa (SINEMET CR)  MG per extended release tablet Pt takes 1 tab at night      testosterone (ANDROGEL) 50 MG/5GM (1%) GEL 1% gel Apply 1 Package topically daily for 90 doses. 30 Package 3    teriparatide, recombinant, (FORTEO) 600 MCG/2.4ML SOLN injection Inject 0.08 mLs into the skin daily 2.4 mL 11    gabapentin (NEURONTIN) 300 MG capsule 300 mg in AM and lunch time, 600 mg QHS (Patient taking differently: 600 mg.  Pt takes 600 mg 3 times daily) 360 capsule 0    carbidopa-levodopa (SINEMET)  MG per tablet Pt take 4 tabs four times a day. 5    Multiple Vitamin (MULTI VITAMIN MENS PO) Take 1 tablet by mouth Take 1 tab po daily      Cholecalciferol (VITAMIN D PO) Take 50,000 Units by mouth      meloxicam (MOBIC) 15 MG tablet        No current facility-administered medications for this visit. OBJECTIVE:    PHYSICAL EXAMINATION:  Due to this being a TeleHealth encounter, evaluation of the following organ systems is limited: Vitals/Constitutional/EENT/Resp/CV/GI//MS/Neuro/Skin/Heme-Lymph-Imm. Constitutional: Normal  Level of distress: Normal  Overall appearance: Normal  Psychiatric: Normal, Appropriate mood and affect. Good insight, good judgment. Orientation: Oriented to time, place, person and situation. Eyes: Vision grossly intact, no visible conjunctival injection  Hearing: Grossly intact  Musculoskeletal: (exam limited by TeleHealth encounter), no visible synovitis of the hand joints, full fist formation, b/l wrist joints w/ chronic synovial thickening no visible synovitis, limited flexion/extension    DATA:  Labs:   I personally reviewed interval labs and discussed w/ the pt in detail which showed:    CBC w/ diff (8/5/20): H/H wnl    Lab Results   Component Value Date    WBC 6.8 08/20/2019    HGB 14.8 08/05/2020    HCT 44.5 08/05/2020    MCV 96.5 08/05/2020     08/20/2019    LYMPHOPCT 21.1 08/20/2019    RBC 4.53 08/20/2019    MCH 32.2 08/05/2020    MCHC 33.3 08/05/2020    RDW 15.4 (H) 08/05/2020     Lab Results   Component Value Date     08/05/2020    K 4.2 08/05/2020     08/05/2020    CO2 27 08/05/2020    BUN 16 08/05/2020    CREATININE 0.97 08/05/2020    GLUCOSE 94 08/05/2020    CALCIUM 9.4 08/05/2020    PROT 6.8 08/05/2020    LABALBU 4.1 08/05/2020    BILITOT 0.5 08/05/2020    ALKPHOS 69 08/05/2020    AST 15 08/05/2020    ALT 7 08/05/2020    LABGLOM >60 03/11/2019    GFRAA >60 03/11/2019    AGRATIO 1.4 03/11/2019    GLOB 2.9 03/11/2019      TSH, T4, thyroid peroxidase, anti-thyroglob again obscured at the postoperative sites.      X-ray L-spine (12/17/19): IMPRESSION:  1.  Grade 1 spondylolisthesis at L4-5 and mild retrolisthesis at L3-4. No instability with flexion and extension.     X-rays (4/4/19):  B/l hands:   Severe degenerative changes about the wrists including the intercarpal spaces and radiocarpal spaces w/ SLAC wrist deformity bilaterally, chronic.  TFC complex calcifications are noted.  Crystal disease arthropathy is a primary consideration.      L knee:   Bicompartmental degenerative changes are noted.      SI joints:   No erosion or ankylosis appreciated.      Procedures:   EMG (10/17/18): Impression:   1. Electrodiagnostic evidence is consistent with severe right carpal tunnel syndrome. There is median sensory and motor neuropathy and evidence of chronic denervation in the abductor pollicis brevis. 2. Electrodiagnostic evidence is consistent with moderate right ulnar sensory and motor neuropathy, with amplitude drop across the elbow segment. No evidence of acute or chronic denervation in the first dorsal interosseus and flexor carpi ulnaris. 3. Electrodiagnostic evidence is suggestive of chronic right C6 motor radiculopathy. 4. No electrodiagnostic evidence of right brachial plexopathy, peripheral neuropathy, or myopathy in nerves / muscles tested. Above results were discussed w/ the pt in detail during today's visit. 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 Pt had a normal eye exam on 1/16/20 for HCQ toxicity, reminded pt that he will be due for his next eye exam in 1/21 and placed order for repeat CBC w/ diff. Increase HCQ dose from 300 mg to 200 mg BID based on his most recent weight. Pt is following w/ [facility] for his extensive degenerative arthritis of his spine.     He has been started on Forteo by Endocrinology for osteoporosis. Liseth Hameed was seen today for follow-up. 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  -     Discontinue: hydroxychloroquine (PLAQUENIL) 200 MG tablet; Take 1 tablet by mouth daily TAKE 1 AND 1/2 TABLETS BY MOUTH ONE TIME A DAY with meals  -     hydroxychloroquine (PLAQUENIL) 200 MG tablet; Take 1 tablet by mouth 2 times daily    High risk medication use  -     CBC Auto Differential; Future    Encounter for therapeutic drug monitoring  -     CBC Auto Differential; Future          Orders Placed This Encounter   Procedures    CBC Auto Differential     Standing Status:   Future     Standing Expiration Date:   6/1/2021     Outpatient Encounter Medications as of 12/1/2020   Medication Sig Dispense Refill    hydroxychloroquine (PLAQUENIL) 200 MG tablet Take 1 tablet by mouth 2 times daily 180 tablet 1    cyclobenzaprine (FLEXERIL) 5 MG tablet TAKE 1 TABLET BY MOUTH THREE TIMES A DAY AS NEEDED FOR MUSCLE SPASM      carbidopa-levodopa (SINEMET CR)  MG per extended release tablet Pt takes 1 tab at night      testosterone (ANDROGEL) 50 MG/5GM (1%) GEL 1% gel Apply 1 Package topically daily for 90 doses. 30 Package 3    teriparatide, recombinant, (FORTEO) 600 MCG/2.4ML SOLN injection Inject 0.08 mLs into the skin daily 2.4 mL 11    gabapentin (NEURONTIN) 300 MG capsule 300 mg in AM and lunch time, 600 mg QHS (Patient taking differently: 600 mg. Pt takes 600 mg 3 times daily) 360 capsule 0    carbidopa-levodopa (SINEMET)  MG per tablet Pt take 4 tabs four times a day.   5    Multiple Vitamin (MULTI VITAMIN MENS PO) Take 1 tablet by mouth Take 1 tab po daily      Cholecalciferol (VITAMIN D PO) Take 50,000 Units by mouth      [DISCONTINUED] hydroxychloroquine (PLAQUENIL) 200 MG tablet Take 1 tablet by mouth daily TAKE 1 AND 1/2 TABLETS BY MOUTH ONE TIME A DAY with meals 90 tablet 1    [DISCONTINUED] hydroxychloroquine (PLAQUENIL) 200 MG tablet TAKE 1 AND 1/2 TABLETS BY MOUTH ONE TIME A DAY with meals 45 tablet 3    meloxicam (MOBIC) 15 MG tablet        No facility-administered encounter medications on file as of 12/1/2020. Return in about 6 months (around 6/1/2021) for lab result discussion and treatment plan, medication monitoring. The risks and benefits of my recommendations, as well as other treatment options, benefits and side effects were discussed with the patient today. Questions were answered. --Amanda Mora MD on 12/1/2020 at 10:05 AM    An electronic signature was used to authenticate this note.

## 2020-12-01 ENCOUNTER — VIRTUAL VISIT (OUTPATIENT)
Dept: RHEUMATOLOGY | Age: 65
End: 2020-12-01
Payer: COMMERCIAL

## 2020-12-01 PROCEDURE — 4040F PNEUMOC VAC/ADMIN/RCVD: CPT | Performed by: INTERNAL MEDICINE

## 2020-12-01 PROCEDURE — G8427 DOCREV CUR MEDS BY ELIG CLIN: HCPCS | Performed by: INTERNAL MEDICINE

## 2020-12-01 PROCEDURE — 99213 OFFICE O/P EST LOW 20 MIN: CPT | Performed by: INTERNAL MEDICINE

## 2020-12-01 PROCEDURE — G8417 CALC BMI ABV UP PARAM F/U: HCPCS | Performed by: INTERNAL MEDICINE

## 2020-12-01 PROCEDURE — 1036F TOBACCO NON-USER: CPT | Performed by: INTERNAL MEDICINE

## 2020-12-01 PROCEDURE — G8484 FLU IMMUNIZE NO ADMIN: HCPCS | Performed by: INTERNAL MEDICINE

## 2020-12-01 PROCEDURE — 1123F ACP DISCUSS/DSCN MKR DOCD: CPT | Performed by: INTERNAL MEDICINE

## 2020-12-01 PROCEDURE — 3017F COLORECTAL CA SCREEN DOC REV: CPT | Performed by: INTERNAL MEDICINE

## 2020-12-01 RX ORDER — HYDROXYCHLOROQUINE SULFATE 200 MG/1
200 TABLET, FILM COATED ORAL 2 TIMES DAILY
Qty: 180 TABLET | Refills: 1 | Status: SHIPPED | OUTPATIENT
Start: 2020-12-01 | End: 2021-03-01

## 2020-12-01 RX ORDER — HYDROXYCHLOROQUINE SULFATE 200 MG/1
200 TABLET, FILM COATED ORAL DAILY
Qty: 90 TABLET | Refills: 1 | Status: SHIPPED | OUTPATIENT
Start: 2020-12-01 | End: 2020-12-01

## 2020-12-14 ENCOUNTER — TELEPHONE (OUTPATIENT)
Dept: ENDOCRINOLOGY | Age: 65
End: 2020-12-14

## 2020-12-16 ENCOUNTER — PATIENT MESSAGE (OUTPATIENT)
Dept: ENDOCRINOLOGY | Age: 65
End: 2020-12-16

## 2020-12-21 ENCOUNTER — PATIENT MESSAGE (OUTPATIENT)
Dept: ENDOCRINOLOGY | Age: 65
End: 2020-12-21

## 2020-12-21 NOTE — TELEPHONE ENCOUNTER
From: Adore Sylvester  To: Zee Blanca MD  Sent: 12/21/2020 3:53 PM EST  Subject: Prescription Question    Clayton Manuel,    The [facility] called today and the need a quantity before they can send out the [facility]. Their phone number is [phone]. Thanks for your help with this. Happy Holidays. Alvaro Krishnamurthy      ----- Message -----   From:SHANIQUA Sarah   Sent:12/17/2020 8:05 AM EST   To:Landen Negron   Subject:RE: Prescription Question    Cachorro Sasha,     Your prescription was sent to your pharmacy. Stay safe and healthy! Stacey John LPN  [facility]) Endocrinology  O: [phone]  F: [phone]  [email or web address]      ----- Message -----   From:Landen Negron   Sent:12/16/2020 6:25 PM EST   To:Vaishnavi Cazares MD   Subject:Prescription Question    Dr. Herman Minus prescription for [facility] has no refills left. If he still needs it, please send a new prescription to [address] [place] [address]. The phone number is [phone]. I do not have their fax number. Thanks for your help and have a great holiday.     Alvaro Krishnamurthy

## 2021-02-22 ENCOUNTER — VIRTUAL VISIT (OUTPATIENT)
Dept: ENDOCRINOLOGY | Age: 66
End: 2021-02-22
Payer: MEDICARE

## 2021-02-22 DIAGNOSIS — M80.00XA AGE-RELATED OSTEOPOROSIS WITH CURRENT PATHOLOGICAL FRACTURE, INITIAL ENCOUNTER: Primary | ICD-10-CM

## 2021-02-22 DIAGNOSIS — E29.1 HYPOGONADISM IN MALE: ICD-10-CM

## 2021-02-22 PROCEDURE — 99214 OFFICE O/P EST MOD 30 MIN: CPT | Performed by: INTERNAL MEDICINE

## 2021-02-22 PROCEDURE — 1123F ACP DISCUSS/DSCN MKR DOCD: CPT | Performed by: INTERNAL MEDICINE

## 2021-02-22 PROCEDURE — G8427 DOCREV CUR MEDS BY ELIG CLIN: HCPCS | Performed by: INTERNAL MEDICINE

## 2021-02-22 PROCEDURE — 3017F COLORECTAL CA SCREEN DOC REV: CPT | Performed by: INTERNAL MEDICINE

## 2021-02-22 PROCEDURE — 4040F PNEUMOC VAC/ADMIN/RCVD: CPT | Performed by: INTERNAL MEDICINE

## 2021-02-22 NOTE — PROGRESS NOTES
Patient is seen here for management of Osteoporosis & hypogonadism. He has been diagnosed with Osteoporosis in 2013 by his orthopedic surgeon Dr. Aimee Alex he was started on Fosamax, he recalls it was switched to [facility] [place] monthly, then he needed dental implants so the bisphosphonates were stopped and were never resumed after wards. As he never followed up with the orthopedic doctor again. He also remembers that his dental implant was not successful . He has taken the following medications for his Osteoporosis:FOsamax     He has no history of fracture of long bones of upper or lower legs. He has  history of vertebral fractures T8 after a fall from ladder in 2009( fall 6-8 feet )   . He has approximately 3 inches height loss as compared to his  young age. He has underwent C-spine fusion surgery as well as lumbar spine fusion surgery. He  does  take vitamin D supplements. + MVI. He  has  1-2 daily servings of cheese, milk or yogurt daily. He exercises regularly. He  has no history of long term steroids, chemotherapy, multiple myeloma, prolonged immobilization, liver or kidney disease, malabsorption, organ transplant, or immunosuppressive medications. No history of diabetes. No history of any other agents with adverse effects on bony skeleton. He is not prone to falls and no history of recent falls. No history of rheumatoid arthritis or other autoimmune disorders. positive history of kidney stones in the past once . No history of proximal muscle weakness, thinning of skin, easy bruisability, centripetal accumulation of fat and excessive weight gain recently to suggest Cushing's syndrome. He does not smoke. No excessive alcohol use. There is no parental history of hip fracture. No family history of Osteoporosis. He had dental implants and stopped fosamax a year before that and never resumed it  and there is no upcoming dental surgeries planned.     He has severe OA s/p rt knee replacement He has spinal stenosis   He has neck surgery at C spine level Fusion done   He has lower lumbar spine surgery   Also diagnosed with  Parkinson and is having trouble walking   He had carpal tunnel surgery done in the past as well    Neurosurgery visits revealed  cervical myelopathy s/p C5-T1 posterior decompression fixation and fusion. . There was concern of hardware failure on cervical x-ray. He was told that he would require revision of these fusion surgeries in future but the quality of bone during these operations were was found to be week and hence was referred for osteoporosis    Family hx of Daphne Sims ,son has Graves and one of his sisters have severe Graves Orbitopathy and required surgical decompression of her eyes patient's thyroid receptor antibodies were negative and thyroid fx test are within normal limits     --- patient underwent successful revision of C2-T2 posterior fusion on June 2019 per Dr. Christie Jiménez    Interim history  Aug 2020 , patient started  Forteo injections v Anselm Rumple 2019 has not noticed any side effects from the medication denies any recent fractures denies any kidney stones.   He has been using testosterone gel and has noted some improvement in his fatigue but could not tell a significant difference in his symptoms  He had back surgery in feb 2020 with some residual back pain ---still using a walker   No new complaints         Past Medical History:   Diagnosis Date    Migraine     cluster     Parkinson disease ([address]      Past Surgical History:   Procedure Laterality Date    CARPAL TUNNEL RELEASE      x2 R, x1 L     COLONOSCOPY N/A 10/2/2020    COLONOSCOPY POLYPECTOMY SNARE/COLD BIOPSY performed by Anum Card MD at [facility]      childhood & at 24 (L) side     KNEE ARTHROSCOPY Left     KNEE SURGERY Right     LUMBAR FUSION      NECK SURGERY      x2    NECK SURGERY  06/21/2019    C2-T2 fusion     TONSILLECTOMY        Social History Socioeconomic History    Marital status:      Spouse name: Not on file    Number of children: Not on file    Years of education: Not on file    Highest education level: Not on file   Occupational History    Not on file   Social Needs    Financial resource strain: Not on file    Food insecurity     Worry: Not on file     Inability: Not on file    Transportation needs     Medical: Not on file     Non-medical: Not on file   Tobacco Use    Smoking status: Former Smoker    Smokeless tobacco: Never Used   Substance and Sexual Activity    Alcohol use: Never     Frequency: Never    Drug use: Never    Sexual activity: Not on file   Lifestyle    Physical activity     Days per week: Not on file     Minutes per session: Not on file    Stress: Not on file   Relationships    Social connections     Talks on phone: Not on file     Gets together: Not on file     Attends Zoroastrian service: Not on file     Active member of club or organization: Not on file     Attends meetings of clubs or organizations: Not on file     Relationship status: Not on file    Intimate partner violence     Fear of current or ex partner: Not on file     Emotionally abused: Not on file     Physically abused: Not on file     Forced sexual activity: Not on file   Other Topics Concern    Not on file   Social History Narrative    Not on file     Family History   Problem Relation Age of Onset    Osteoporosis Mother     Cancer Father         skin     Prostate Cancer Brother 77     Prior to Admission medications    Medication Sig Start Date End Date Taking?  Authorizing Provider   Ascorbic Acid (VITAMIN C PO) Take by mouth   Yes Historical Provider, MD   cyclobenzaprine (FLEXERIL) 5 MG tablet TAKE 1 TABLET BY MOUTH THREE TIMES A DAY AS NEEDED FOR MUSCLE SPASM 8/5/20  Yes Historical Provider, MD   carbidopa-levodopa (SINEMET CR)  MG per extended release tablet Pt takes 1 tab at night 4/13/20  Yes Historical Provider, MD teriparatide, recombinant, (FORTEO) 600 MCG/2.4ML SOLN injection Inject 0.08 mLs into the skin daily 1/15/20  Yes Hilaria Olea MD   gabapentin (NEURONTIN) 300 MG capsule 300 mg in AM and lunch time, 600 mg QHS  Patient taking differently: 600 mg 4 times daily. Pt takes 600 mg 3 times daily 8/20/19 2/22/21 Yes Danny Bhandari MD   carbidopa-levodopa (SINEMET)  MG per tablet Pt take 4 tabs four times a day. 2/28/19  Yes Historical Provider, MD   Cholecalciferol (VITAMIN D PO) Take 50,000 Units by mouth 12/31/18  Yes Historical Provider, MD   testosterone (ANDROGEL; TESTIM) 50 MG/5GM (1%) GEL 1% gel APPLY 1 PACK ONCE DAILY  3/2/21 6/30/21  Hilaria Olea MD     No Known Allergies   OBJECTIVE:  There were no vitals taken for this visit.        Constitutional: no acute distress, well appearing and well nourished  Psychiatric: oriented to person, place and time, judgement and insight and normal, recent and remote memory intact and mood and affect are normal  Skin: skin and subcutaneous tissue is normal without visible mass,   Head and Face: visual inspection  of head and face revealed no abnormalities  Eyes: visual inspection showed no lid or conjunctival swelling, erythema or discharge, pupils are normal, equal, round  Ears/Nose: external inspection of ears and nose revealed no abnormalities, hearing is grossly normal  Oropharynx/Mouth/Face: lips, tongue and gums appear  normal with no lesions, the voice quality was normal  Neck: neck appears symmetric, with no visible masses,   Pulmonary: no increased work of breathing or signs of respiratory distress,  Musculoskeletal: normal on inspection    Neurological: normal coordination and normal general cortical function        Assessment/Plan:    --- Age-related osteoporosis  Started forteo sept 2019 takes daily   We will repeat DEXA scan in 6 months   Today we discussed that after stopping  Forteo he will start antiresorptive's at that time patient had already taken Fosamax for approximately 2 to 3 years in the past he finds it hard to take it weekly and would like an injectable we will make that decision at 5 to 6 months after reviewing his DEXA scan. Last DEXA scan was in March of 2019 done at [id number] [facility] which shows lumbar spine T score of -1.3, left hip T score of 0.2 which is within normal limits, right femoral neck has a T score of -1.6 which is osteopenic. --- Patient has  prior history of thoracic spine compression fracture. --- his 24-hr urine calcium and creatinine was normal at 330 for on March 2019. Thyroid function test as well as parathyroid hormone levels were also within normal limits . His celiac disease panel was negative and bone turnover markers were not done although they were ordered. .    Patient was counseled on adequate calcium and Vitamin D intake and on fall precautions to minimize fracture risk. ---Advised to take a total of 1200 mg of elemental calcium (including diet and supplements). He  was provided with written information on sources of dietary calcium and general advice on maintaining optimal skeletal health. We also discussed the side effects and contraindications of Forteo therapy including increased risk of osteosarcoma in animal studies. Patient has no apparent contraindications to [facility] therapy     ---Hypogonadism with inappropriately normal FSH and LH  In March 2019 patient was noted to have a T level of 446 with a FSH of 9.5 and LH of 8.9  Patient was started on AndroGel and his last  testosterone level was 434 in aug 2020   will stay on the current dose  His PSA was within normal limits which was done on April third 2019  PSa ordered in dec 2019   Patient was advised to have repeat testosterone level drawn 1 month after taking the medication    --. Parkinson disease   He has been on medication for years   Stable follows with neurology.   Also had Kamille's syndrome and cluster migraine headaches    --Severe  Arthritis  Follows Cristobal Martinez, he is now on Plaquenil. --. Spinal stenosis at L4-L5 level  Status post decompression fusion  Since last visit patient underwent successful revision of C2-T2 posterior fusion on June 2019 per Dr. Codie Johnson         Reviewed and/or ordered clinical lab results Yes  Reviewed and/or ordered radiology tests Yes  Reviewed and/or ordered other diagnostic tests Yes  Discussed test results with performing physician Yes  Made a decision to obtain old records Yes  Reviewed and summarized old records Yes      Romi Hernandez was counseled regarding symptoms of current diagnosis, course and complications of disease if inadequately treated, side effects of medications, diagnosis, treatment options, and prognosis, risks, benefits, complications, and alternatives of treatment, labs, imaging and other studies and treatment targets and goals. He understands instructions and counseling. These diagnosis were discussed and reviewed with the patient including the advantages of drug therapy. He was counseled at this visit on the following: diabetes complication prevention and foot care. TELEHEALTH EVALUATION -- Audio/Visual (During NZUWQ-52 public health emergency)  Pursuant to the emergency declaration under the [facility], [facility] waiver authority and the [employer] and [employer] Act, this Virtual  Visit was conducted, with patient's consent, to reduce the patient's risk of exposure to COVID-19 and provide care for  patient. Services were provided through a video synchronous discussion virtually to substitute for in-person clinic visit. Patient's location : home     Patient provided verbal consent to use the video visit. Total time spent :25 min  Reviewing the chart, conducting an interview, performing a limited exam by video and educating the patient on my assessment plan. Return in about 6 months (around 8/22/2021).     Please note that some or all of this report was generated using voice recognition software. Please notify me in case of any questions about the content of this document, as some errors in transcription may have occurred .

## 2021-03-01 DIAGNOSIS — E29.1 HYPOGONADISM IN MALE: ICD-10-CM

## 2021-03-01 NOTE — TELEPHONE ENCOUNTER
Requested Prescriptions     Pending Prescriptions Disp Refills    testosterone (ANDROGEL; TESTIM) 50 MG/5GM (1%) GEL 1% gel [Pharmacy Med Name: Testosterone Transdermal Gel 50 MG/5GM (1%)] 150 g 0     Sig: APPLY 1 PACK ONCE DAILY       Last OV 2/22/21  Next OV not scheduled

## 2021-03-02 DIAGNOSIS — E29.1 HYPOGONADISM IN MALE: ICD-10-CM

## 2021-03-02 RX ORDER — TESTOSTERONE GEL, 1% 10 MG/G
GEL TRANSDERMAL
Qty: 150 G | Refills: 0 | Status: SHIPPED | OUTPATIENT
Start: 2021-03-02 | End: 2021-03-08

## 2021-03-04 RX ORDER — TESTOSTERONE GEL, 1% 10 MG/G
GEL TRANSDERMAL
Qty: 150 G | Refills: 0 | OUTPATIENT
Start: 2021-03-04

## 2021-03-08 DIAGNOSIS — E29.1 HYPOGONADISM IN MALE: ICD-10-CM

## 2021-03-08 RX ORDER — TESTOSTERONE GEL, 1% 10 MG/G
GEL TRANSDERMAL
Qty: 150 G | Refills: 3 | Status: SHIPPED | OUTPATIENT
Start: 2021-03-08 | End: 2022-02-07

## 2021-05-07 NOTE — PROGRESS NOTES
Nicolas Marmolejo MD  [facility]) Physicians - Rheumatology    [x] [facility]:  [address], [address] [facility] [] [place] Office:  [address]  [place], [address]   Office: [phone]  Fax: [phone]     RHEUMATOLOGY PROGRESS NOTE    SUBJECTIVE:    Background:   Juan David Albright is a 78 y.o. 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 Adilene Hager).  PMHx pertinent for chronic neck and LBP w/ cervical myelopathy s/p C5-T1 posterior decompression fixation and fusion w/ concern for hardware failure s/p revision C2-T2 posterior fusion on 6/21/19, spinal stenosis and degenerative arthritis of the L-spine w/ unstable L4-5 fusion s/p lumbar fusion on 2/20/20, and Parkinson's disease.     Current rheum related meds:   mg daily: started in 8/19  Gabapentin and Flexeril per  Pain Management  Forteo: started in 9/19 by Endocrinology     Prior rheum meds:  Colchicine 0.6 mg daily: took from 4/19 - 8/19, ineffective  Robaxin 750 mg BID  Fosamax, Boniva, d/c'ed d/t dental implants    Past medical/surgical history, medications and allergies are reviewed and updated as appropriate. Interval Hx:   Pt states he developed an arthritis flare involving his R wrist once in the past 6 months. Otherwise denies significant joint pain, swelling or prolonged morning stiffness in his hand or wrist joints. He reports compliance w/ HCQ, he states he had a normal eye exam this January. He reports worsening pain in his L knee. Pain is constant. He had his R knee replaced many yrs ago and had his L knee scoped several times. His Ortho is no longer practicing. He is considering TKR. Pt states he remains on Gabapentin per  Pain Management for his back. He thinks his Parksinson's disease has slightly progressed over the past yr. He remains on Forteo for his osteoporosis.     He has a new 1 month old grand daughter and will be traveling to [place] to see her. Rheumatologic ROS:  Constitutional: denies fever/chills, night sweats, unintentional weight loss  Integumentary: denies photosensitivity, rash, patchy alopecia, or Sx of Raynaud's phenomenon  Cardiovascular: denies CP, palpitations, Hx of pericardial effusion or pericarditis  Respiratory: denies SOB, cough, hemoptysis, or pleurisy  Musculoskeletal:  refer to above HPI     No Known Allergies    Past Medical History:        Diagnosis Date    Migraine     cluster     Parkinson disease ([facility] [address]        Past Surgical History:        Procedure Laterality Date    CARPAL TUNNEL RELEASE      x2 R, x1 L     COLONOSCOPY N/A 10/2/2020    COLONOSCOPY POLYPECTOMY SNARE/COLD BIOPSY performed by Sharda Manuel MD at [facility] 54      childhood & at 17 (L) side     KNEE ARTHROSCOPY Left     KNEE SURGERY Right     LUMBAR FUSION      NECK SURGERY      x2    NECK SURGERY  06/21/2019    C2-T2 fusion     TONSILLECTOMY         Medications:    Current Outpatient Medications   Medication Sig Dispense Refill    predniSONE (DELTASONE) 10 MG tablet Take 2 tablets by mouth daily for 10 days, THEN 1 tablet daily for 10 days, THEN 0.5 tablets daily for 10 days. 35 tablet 1    hydroxychloroquine (PLAQUENIL) 200 MG tablet Take 1 tablet by mouth 2 times daily 180 tablet 1    testosterone (ANDROGEL; TESTIM) 50 MG/5GM (1%) GEL 1% gel APPLY 1 PACK ONCE DAILY  150 g 3    Ascorbic Acid (VITAMIN C PO) Take by mouth      cyclobenzaprine (FLEXERIL) 5 MG tablet TAKE 1 TABLET BY MOUTH THREE TIMES A DAY AS NEEDED FOR MUSCLE SPASM      carbidopa-levodopa (SINEMET CR)  MG per extended release tablet Pt takes 1 tab at night      teriparatide, recombinant, (FORTEO) 600 MCG/2.4ML SOLN injection Inject 0.08 mLs into the skin daily 2.4 mL 11    gabapentin (NEURONTIN) 300 MG capsule 300 mg in AM and lunch time, 600 mg QHS (Patient taking differently: 600 mg 4 times daily.  Pt takes 600 mg 3 times daily) 360 capsule 0    carbidopa-levodopa (SINEMET)  MG per tablet Pt take 4 tabs four times a day. 5    Cholecalciferol (VITAMIN D PO) Take 50,000 Units by mouth       No current facility-administered medications for this visit. OBJECTIVE:  Physical Exam:  /71   Ht 5' 7\" (1.702 m)   Wt 200 lb (90.7 kg)   BMI 31.32 kg/m²     GEN: AAOx3, in NAD, accompanied by wife  HEAD: normocephalic, atraumatic  EYES: no injection or icterus  CVS: RRR  LUNGS: in no acute respiratory distress, CTAB  MSK:   Upper extremities:              Hands: b/l MCP joints w/o synovitis NTTP, there is chronic bony enlargement w/o synovitis of the b/l PIP and DIP joints NTTP, full fist formation w/ good  strength              Wrist: there is extensive bony hypertrophy w/ chronic synovial proliferation no active synovits of the b/l wrist joints (L>R), there is significant fusion of the wrist joints, wrist joints NTTP              Elbow: no synovitis or bursitis, FROM  Lower extremities:              Knees: s/p R TKR, L knee w/ trace effusion and bony enlaregment w/ crepitus              Ankles: no synovitis, FROM              Feet: no toe swelling or pain or warmth on palpation w/ FROM, negative MTP squeeze test  INTEGUMENTARY: no rash or psoriatic lesions, no petechiae, bruises, or palpable purpura, no patchy alopecia, no nail or periungual changes, no clubbing or digital ulcers  PSYCH: blunted affect    DATA:  Labs:   I personally reviewed interval labs and discussed w/ the pt in detail which showed:    Lab Results   Component Value Date    WBC 7.5 12/08/2020    HGB 15.6 12/08/2020    HCT 46.9 12/08/2020    MCV 94.7 12/08/2020     12/08/2020    LYMPHOPCT 17.9 12/08/2020    RBC 4.95 12/08/2020    MCH 31.4 12/08/2020    MCHC 33.2 12/08/2020    RDW 14.1 12/08/2020     Lab Results   Component Value Date     12/08/2020    K 4.2 12/08/2020     12/08/2020    CO2 28 12/08/2020    BUN 12 12/08/2020 CREATININE 0.90 12/08/2020    GLUCOSE 83 12/08/2020    CALCIUM 9.7 12/08/2020    PROT 7.1 12/08/2020    LABALBU 4.3 12/08/2020    BILITOT 0.6 12/08/2020    ALKPHOS 75 12/08/2020    AST 10 (L) 12/08/2020    ALT <3 (L) 12/08/2020    LABGLOM >60 03/11/2019    GFRAA >60 03/11/2019    AGRATIO 1.4 03/11/2019    GLOB 2.9 03/11/2019       TSH, T4, thyroid peroxidase, anti-thyroglob Ab negative (3/11/19)  PTH wnl (3/11/19)  HgbA1c 6.1 (3/11/19)     Interval labs from 4/4/19:  Negative HLA B27, RF, CCP  Negative hepatitis B and C  Negative SPEP and UPEP     PTH wnl (12/18/18)  TSH wnl (2/25/19)  Serum Mg wnl (6/13/19)  Ferritin and transferrin wnl (6/13/19)  Hemochromatosis genetic test (6/17/19): negative H63D, C282Y, S65C     CRP and ESR wnl (4/4/19, 8/20/19)    Imaging:  I personally reviewed interval imaging and discussed w/ the pt in detail which included:    CT C-spine (3/12/19):  Postoperative findings of multilevel anterior and posterior fusion and laminectomy w/ significant loosening and extrusion of the posterior fusion hardware at C5, C6, and T1. The interbody grafts at C4-5 and C5-6 are subtotally incorporated with minimal subtle questionable lucency around the C4 screws, no definite screw loosening at C5 or C6. Advanced multilevel cervical degenerative disc disease and facet arthrosis causing multilevel neural foraminal narrowing. Other level specific details are discussed above and on the prior MRI report. The cord compression and cord signal abnormality at C6-7 is much better demonstrated on MRI.      X-ray C-spine 8/6/19  IMPRESSION:  Stable postoperative changes from C4-C6 ACDF and C2-T2 posterior fusion. No hardware complications seen.      CT L-spine (3/12/19):  Postoperative findings of L4-S1 fusion again identified with similar appearance of bilateral L4 pedicle screw loosening, with subtle increased anterolisthesis at L4-5.  Asymmetric widening of a left facet defect at L4-5 is similar to slightly more evident, with suspected pseudoarthrosis along the medial margin of a previously ankylosed right L4-5 facet joint. Unstable L4-5 fusion is again suspected. Definite loosening of the right L5 pedicle screw is not seen. The L5-S1 fusion appears solid. Multilevel degenerative disc disease and facet stenosis with other level specific details discussed above. The thecal sac is again obscured at the postoperative sites.      X-ray L-spine (12/17/19): IMPRESSION:  1.  Grade 1 spondylolisthesis at L4-5 and mild retrolisthesis at L3-4. No instability with flexion and extension.     X-rays (4/4/19):  B/l hands:   Severe degenerative changes about the wrists including the intercarpal spaces and radiocarpal spaces w/ SLAC wrist deformity bilaterally, chronic.  TFC complex calcifications are noted.  Crystal disease arthropathy is a primary consideration.      L knee:   Bicompartmental degenerative changes are noted.      SI joints:   No erosion or ankylosis appreciated.      Procedures:   EMG (10/17/18): Impression:   1. Electrodiagnostic evidence is consistent with severe right carpal tunnel syndrome. There is median sensory and motor neuropathy and evidence of chronic denervation in the abductor pollicis brevis. 2. Electrodiagnostic evidence is consistent with moderate right ulnar sensory and motor neuropathy, with amplitude drop across the elbow segment. No evidence of acute or chronic denervation in the first dorsal interosseus and flexor carpi ulnaris. 3. Electrodiagnostic evidence is suggestive of chronic right C6 motor radiculopathy. 4. No electrodiagnostic evidence of right brachial plexopathy, peripheral neuropathy, or myopathy in nerves / muscles tested. Above results were discussed w/ the pt in detail during today's visit. 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  Will track down eye exam from January, repeat CBC now. Discussed his OA of knees, he is s/p R TKR. He is reporting worsening pain in his L knee joint, he reports prior Hx of multiple arthroscopies in his L knee and is now considering TKR. Made referral to Ortho.     Pt is following /  Neurosurgery and Pain Management for his extensive degenerative arthritis of his spine.     He has been started on Forteo by Endocrinology for osteoporosis. Alee Vidal was seen today for follow-up. 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 -     hydroxychloroquine (PLAQUENIL) 200 MG tablet; Take 1 tablet by mouth 2 times daily    High risk medication use  -     CBC Auto Differential; Future    Encounter for therapeutic drug monitoring  -     CBC Auto Differential; Future    Primary osteoarthritis of both knees  -     Roxie West MD, Orthopedic Surgery, [facility]          Orders Placed This Encounter   Procedures    CBC Auto Differential     Standing Status:   Future     Standing Expiration Date:   11/11/2021    Karol Joiner MD, Orthopedic Surgery, [facility]     Referral Priority:   Routine     Referral Type:   Eval and Treat     Referral Reason:   Specialty Services Required     Referred to Provider:   Cheyanne Suazo MD     Requested Specialty:   Orthopedic Surgery     Number of Visits Requested:   1     Outpatient Encounter Medications as of 5/11/2021   Medication Sig Dispense Refill    predniSONE (DELTASONE) 10 MG tablet Take 2 tablets by mouth daily for 10 days, THEN 1 tablet daily for 10 days, THEN 0.5 tablets daily for 10 days.  35 tablet 1    hydroxychloroquine (PLAQUENIL) 200 MG tablet Take 1 tablet by mouth 2 times daily 180 tablet 1    testosterone (ANDROGEL; TESTIM) 50 MG/5GM (1%) GEL 1% gel APPLY 1 PACK ONCE DAILY  150 g 3    Ascorbic Acid (VITAMIN C PO) Take by mouth      cyclobenzaprine (FLEXERIL) 5 MG tablet TAKE 1 TABLET BY MOUTH THREE TIMES A DAY AS NEEDED FOR MUSCLE SPASM      carbidopa-levodopa (SINEMET CR)  MG per extended release tablet Pt takes 1 tab at night      teriparatide, recombinant, (FORTEO) 600 MCG/2.4ML SOLN injection Inject 0.08 mLs into the skin daily 2.4 mL 11    gabapentin (NEURONTIN) 300 MG capsule 300 mg in AM and lunch time, 600 mg QHS (Patient taking differently: 600 mg 4 times daily. Pt takes 600 mg 3 times daily) 360 capsule 0    carbidopa-levodopa (SINEMET)  MG per tablet Pt take 4 tabs four times a day. 5    Cholecalciferol (VITAMIN D PO) Take 50,000 Units by mouth      [DISCONTINUED] hydroxychloroquine (PLAQUENIL) 200 MG tablet Take by mouth       No facility-administered encounter medications on file as of 5/11/2021. Return in about 6 months (around 11/11/2021) for lab result discussion and treatment plan, medication monitoring. The risks and benefits of my recommendations, as well as other treatment options, benefits and side effects were discussed with the patient today. Questions were answered. NOTE: This report is transcribed by using voice recognition software dragon. Every effort is made to ensure accuracy; however, inadvertent computerized  transcription errors may be present.

## 2021-05-11 ENCOUNTER — OFFICE VISIT (OUTPATIENT)
Dept: RHEUMATOLOGY | Age: 66
End: 2021-05-11
Payer: MEDICARE

## 2021-05-11 VITALS
WEIGHT: 200 LBS | DIASTOLIC BLOOD PRESSURE: 72 MMHG | SYSTOLIC BLOOD PRESSURE: 124 MMHG | BODY MASS INDEX: 31.39 KG/M2 | HEIGHT: 67 IN

## 2021-05-11 DIAGNOSIS — M17.0 PRIMARY OSTEOARTHRITIS OF BOTH KNEES: ICD-10-CM

## 2021-05-11 DIAGNOSIS — Z51.81 ENCOUNTER FOR THERAPEUTIC DRUG MONITORING: ICD-10-CM

## 2021-05-11 DIAGNOSIS — Z79.899 HIGH RISK MEDICATION USE: ICD-10-CM

## 2021-05-11 DIAGNOSIS — M11.00 CHONDROCALCINOSIS DUE TO CALCIUM HYDROXYAPATITE CRYSTALS: Primary | ICD-10-CM

## 2021-05-11 LAB
BASOPHILS ABSOLUTE: 0.2 K/UL (ref 0–0.2)
BASOPHILS RELATIVE PERCENT: 1.9 %
EOSINOPHILS ABSOLUTE: 0.4 K/UL (ref 0–0.6)
EOSINOPHILS RELATIVE PERCENT: 4.5 %
HCT VFR BLD CALC: 45.1 % (ref 40.5–52.5)
HEMOGLOBIN: 14.9 G/DL (ref 13.5–17.5)
LYMPHOCYTES ABSOLUTE: 1.7 K/UL (ref 1–5.1)
LYMPHOCYTES RELATIVE PERCENT: 19.3 %
MCH RBC QN AUTO: 31.7 PG (ref 26–34)
MCHC RBC AUTO-ENTMCNC: 33.2 G/DL (ref 31–36)
MCV RBC AUTO: 95.6 FL (ref 80–100)
MONOCYTES ABSOLUTE: 1.2 K/UL (ref 0–1.3)
MONOCYTES RELATIVE PERCENT: 14 %
NEUTROPHILS ABSOLUTE: 5.3 K/UL (ref 1.7–7.7)
NEUTROPHILS RELATIVE PERCENT: 60.3 %
PDW BLD-RTO: 14.6 % (ref 12.4–15.4)
PLATELET # BLD: 404 K/UL (ref 135–450)
PMV BLD AUTO: 8.6 FL (ref 5–10.5)
RBC # BLD: 4.72 M/UL (ref 4.2–5.9)
WBC # BLD: 8.7 K/UL (ref 4–11)

## 2021-05-11 PROCEDURE — 99214 OFFICE O/P EST MOD 30 MIN: CPT | Performed by: INTERNAL MEDICINE

## 2021-05-11 PROCEDURE — G8427 DOCREV CUR MEDS BY ELIG CLIN: HCPCS | Performed by: INTERNAL MEDICINE

## 2021-05-11 PROCEDURE — 1036F TOBACCO NON-USER: CPT | Performed by: INTERNAL MEDICINE

## 2021-05-11 PROCEDURE — 3017F COLORECTAL CA SCREEN DOC REV: CPT | Performed by: INTERNAL MEDICINE

## 2021-05-11 PROCEDURE — 1123F ACP DISCUSS/DSCN MKR DOCD: CPT | Performed by: INTERNAL MEDICINE

## 2021-05-11 PROCEDURE — 4040F PNEUMOC VAC/ADMIN/RCVD: CPT | Performed by: INTERNAL MEDICINE

## 2021-05-11 PROCEDURE — G8417 CALC BMI ABV UP PARAM F/U: HCPCS | Performed by: INTERNAL MEDICINE

## 2021-05-11 RX ORDER — HYDROXYCHLOROQUINE SULFATE 200 MG/1
TABLET, FILM COATED ORAL
COMMUNITY
End: 2021-05-11 | Stop reason: SDUPTHER

## 2021-05-11 RX ORDER — HYDROXYCHLOROQUINE SULFATE 200 MG/1
200 TABLET, FILM COATED ORAL 2 TIMES DAILY
Qty: 180 TABLET | Refills: 1 | Status: SHIPPED | OUTPATIENT
Start: 2021-05-11 | End: 2021-08-09

## 2021-05-11 RX ORDER — PREDNISONE 10 MG/1
TABLET ORAL
Qty: 35 TABLET | Refills: 1 | Status: SHIPPED | OUTPATIENT
Start: 2021-05-11 | End: 2021-06-10

## 2021-05-25 NOTE — PROGRESS NOTES
@[facility]@     Gary Cole MD  [address] Physicians  [facility]    Rheumatology Progress Note  SUBJECTIVE:    Background:   Jessica Gonzales is a 71 y.o. male w/ chronic CPPD arthropathy w/ prior episand chronic LBP. PMHx pertinent for Hx of osteoporosis, Parkinson's disease, s/p b/l CTS surgery, cervical myelopathy s/p C5-T1 posterior decompression fixation and fusion w/ concern for hardware failure, spinal stenosis and degenerative arthritis of the L-spine w/ unstable L4-5 fusion seen on recent CT. Past medical/surgical history, medications and allergies are reviewed and updated as appropriate. Interval Hx:   Since his last visit, pt states he has not had any recurrent attacks of pseudogout. He reports chronically limited ROM in his wrist joints but otherwise denies any joint pain or swelling. Denies any morning stiffness. Pt states he's scheduled to have neck surgery on 7/18/19. He reports persistent paresthesias, tingling down his legs. He reports difficulty walking d/t chronic LBP and LE weakness. He was told that he will require lower back surgery after his cervical spine surgery.     Constitutional: +chronic fatigue, denies fever/chills, night sweats, unintentional weight loss  Integumentary: denies photosensitivity, rash, patchy alopecia, or Sx of Raynaud's phenomenon  Eyes: denies dry eyes, redness or pain, visual disturbance, or floaters  Ears: denies hearing loss, tinnitus, vertigo, or recurrent ear infections  Nose: denies nasal ulcers or recurrent sinusitis  Oral cavity: denies dry mouth or oral ulcers  Cardiovascular: denies CP, palpitations, Hx of pericardial effusion or pericarditis  Respiratory: denies SOB, cough, hemoptysis, or pleurisy  Gastrointestinal: denies heart burn, dysphagia or esophageal dysmotility, denies change in bowel habits or Sx of IBD  Genitourinary: denies change in urine amount or urine appearance, denies frothy CREATININE 0.8 03/11/2019    GLUCOSE 112 (H) 03/11/2019    CALCIUM 9.8 03/11/2019    PROT 7.7 04/04/2019    LABALBU 4.0 04/04/2019    BILITOT <0.2 03/11/2019    ALKPHOS 98 03/11/2019    AST 9 (L) 03/11/2019    ALT <5 (L) 03/11/2019    LABGLOM >60 03/11/2019    GFRAA >60 03/11/2019    AGRATIO 1.4 03/11/2019    GLOB 2.9 03/11/2019     TSH, T4, thyroid peroxidase, anti-thyroglob Ab negative (3/11/19)  PTH wnl (3/11/19)  HgbA1c 6.1 (3/11/19)    Interval labs from 4/4/19:  CRP and ESR wnl  Negative HLA B27, RF, CCP  Negative hepatitis B and C  Negative SPEP and UPEP    Imaging:   I personally reviewed interval imaging and discussed w/ the pt in detail which included:  CT C-spine (3/12/19):  Postoperative findings of multilevel anterior and posterior fusion and laminectomy w/ significant loosening and extrusion of the posterior fusion hardware at C5, C6, and T1. The interbody grafts at C4-5 and C5-6 are subtotally incorporated with minimal subtle questionable lucency around the C4 screws, no definite screw loosening at C5 or C6. Advanced multilevel cervical degenerative disc disease and facet arthrosis causing multilevel neural foraminal narrowing. Other level specific details are discussed above and on the prior MRI report. The cord compression and cord signal abnormality at C6-7 is much better demonstrated on MRI.      CT L-spine (3/12/19):  Postoperative findings of L4-S1 fusion again identified with similar appearance of bilateral L4 pedicle screw loosening, with subtle increased anterolisthesis at L4-5. Asymmetric widening of a left facet defect at L4-5 is similar to slightly more evident, with suspected pseudoarthrosis along the medial margin of a previously ankylosed right L4-5 facet joint. Unstable L4-5 fusion is again suspected. Definite loosening of the right L5 pedicle screw is not seen. The L5-S1 fusion appears solid.     Multilevel degenerative disc disease and facet stenosis with other level specific details discussed above. The thecal sac is again obscured at the postoperative sites.     X-rays (4/4/19):  B/l hands: Severe degenerative changes about the wrists including the intercarpal spaces and radiocarpal spaces w/ SLAC wrist deformity bilaterally, chronic. TFC complex calcifications are noted. Crystal disease arthropathy is a primary consideration. L knee: Bicompartmental degenerative changes are noted. SI joints: No erosion or ankylosis appreciated. Above results were discussed w/ the pt in detail during today's visit. ASSESSMENT/PLAN:   Discussed recent rheumatologic w/u showing chronic CPPD arthropathy, d/w pt and his wife the disease course and prognosis of this condition. Start Colchicine 0.6 mg daily, discussed s/e and risks of this medication, pt states he's not on statin therapy. Check safety labs in 6 wks, will also complete w/u for CPPD at that time. Start Gabapentin titrated up to 300 mg TID for neuropathic pain, discussed his neurosurgery plan for his cervical and lumbar hardware loosening. I instructed the pt to obtain EMG study and discuss w/ his neurologist if his neuropathic pain is not controlled on Gabapentin. I checked the pt's OARRS report and there was no evidence of Gabapentin abuse. His osteoporosis is managed by Endocrinology. Florencia Payton was seen today for follow-up. Diagnoses and all orders for this visit:    H/O calcium pyrophosphate deposition disease (CPPD)  -     Colchicine 0.6 MG CAPS; Take 1 capsule by mouth daily  -     CBC Auto Differential; Standing  -     Comprehensive Metabolic Panel; Future  -     Phosphorus; Future  -     Magnesium; Future  -     TIBC AND %SATURATION; Future  -     Ferritin; Future  -     TRANSFERRIN; Future  -     HEMOCHROMATOSIS MUTATION; Future    Osteoarthritis of multiple joints, unspecified osteoarthritis type    Idiopathic peripheral neuropathy  -     gabapentin (NEURONTIN) 300 MG capsule;  Take 1 capsule by mouth 3 times daily for 180 days. High risk medication use  -     CBC Auto Differential; Standing  -     Comprehensive Metabolic Panel; Future          Orders Placed This Encounter   Procedures    CBC Auto Differential     Standing Status:   Standing     Number of Occurrences:   6     Standing Expiration Date:   4/18/2020    Comprehensive Metabolic Panel     Standing Status:   Future     Standing Expiration Date:   5/18/2019    Phosphorus     Standing Status:   Future     Standing Expiration Date:   8/18/2019    Magnesium     Standing Status:   Future     Standing Expiration Date:   8/18/2019    TIBC AND %SATURATION     Standing Status:   Future     Standing Expiration Date:   10/18/2019    Ferritin     Standing Status:   Future     Standing Expiration Date:   7/18/2019    TRANSFERRIN     Standing Status:   Future     Standing Expiration Date:   10/18/2019    HEMOCHROMATOSIS MUTATION     Standing Status:   Future     Standing Expiration Date:   4/18/2020     Outpatient Encounter Medications as of 4/18/2019   Medication Sig Dispense Refill    gabapentin (NEURONTIN) 300 MG capsule Take 1 capsule by mouth 3 times daily for 180 days. 270 capsule 0    Colchicine 0.6 MG CAPS Take 1 capsule by mouth daily 90 capsule 0    Testosterone (ANDROGEL) 20.25 MG/ACT (1.62%) GEL gel Place 1 actuation onto the skin daily for 120 days. 1 Bottle 3    carbidopa-levodopa (SINEMET)  MG per tablet Take 3 tabs five times per day  5    Multiple Vitamin (MULTI VITAMIN MENS PO) Take 1 tablet by mouth Take 1 tab po daily      Cholecalciferol (VITAMIN D PO) Take 50,000 Units by mouth       No facility-administered encounter medications on file as of 4/18/2019. Return in about 3 months (around 7/18/2019) for lab result discussion and treatment plan, medication monitoring. The risks and benefits of my recommendations, as well as other treatment options, benefits and side effects were discussed with the patient today. Bilateral Helical Rim Advancement Flap Text: The defect edges were debeveled with a #15 blade scalpel.  Given the location of the defect and the proximity to free margins (helical rim) a bilateral helical rim advancement flap was deemed most appropriate.  Using a sterile surgical marker, the appropriate advancement flaps were drawn incorporating the defect and placing the expected incisions between the helical rim and antihelix where possible.  The area thus outlined was incised through and through with a #15 scalpel blade.  With a skin hook and iris scissors, the flaps were gently and sharply undermined and freed up.

## 2021-06-02 ENCOUNTER — CLINICAL DOCUMENTATION (OUTPATIENT)
Dept: OTHER | Age: 66
End: 2021-06-02

## 2021-08-23 ENCOUNTER — VIRTUAL VISIT (OUTPATIENT)
Dept: ENDOCRINOLOGY | Age: 66
End: 2021-08-23
Payer: MEDICARE

## 2021-08-23 ENCOUNTER — TELEPHONE (OUTPATIENT)
Dept: ENDOCRINOLOGY | Age: 66
End: 2021-08-23

## 2021-08-23 DIAGNOSIS — M81.0 AGE-RELATED OSTEOPOROSIS WITHOUT CURRENT PATHOLOGICAL FRACTURE: Primary | ICD-10-CM

## 2021-08-23 DIAGNOSIS — E29.1 HYPOGONADISM IN MALE: Primary | ICD-10-CM

## 2021-08-23 DIAGNOSIS — G20 PARKINSON DISEASE (HCC): ICD-10-CM

## 2021-08-23 DIAGNOSIS — M80.00XA AGE-RELATED OSTEOPOROSIS WITH CURRENT PATHOLOGICAL FRACTURE, INITIAL ENCOUNTER: ICD-10-CM

## 2021-08-23 DIAGNOSIS — R97.20 ELEVATED PROSTATE SPECIFIC ANTIGEN (PSA): ICD-10-CM

## 2021-08-23 PROCEDURE — G8417 CALC BMI ABV UP PARAM F/U: HCPCS | Performed by: INTERNAL MEDICINE

## 2021-08-23 PROCEDURE — 99214 OFFICE O/P EST MOD 30 MIN: CPT | Performed by: INTERNAL MEDICINE

## 2021-08-23 PROCEDURE — 1123F ACP DISCUSS/DSCN MKR DOCD: CPT | Performed by: INTERNAL MEDICINE

## 2021-08-23 PROCEDURE — G8427 DOCREV CUR MEDS BY ELIG CLIN: HCPCS | Performed by: INTERNAL MEDICINE

## 2021-08-23 PROCEDURE — 3017F COLORECTAL CA SCREEN DOC REV: CPT | Performed by: INTERNAL MEDICINE

## 2021-08-23 PROCEDURE — 1036F TOBACCO NON-USER: CPT | Performed by: INTERNAL MEDICINE

## 2021-08-23 PROCEDURE — 4040F PNEUMOC VAC/ADMIN/RCVD: CPT | Performed by: INTERNAL MEDICINE

## 2021-08-23 RX ORDER — ALENDRONATE SODIUM 70 MG/1
70 TABLET ORAL
Qty: 4 TABLET | Refills: 3 | Status: SHIPPED | OUTPATIENT
Start: 2021-08-23 | End: 2022-02-07 | Stop reason: SDUPTHER

## 2021-08-23 NOTE — PROGRESS NOTES
Patient is seen here for management of Osteoporosis & hypogonadism. He has been diagnosed with Osteoporosis in 2013 by his orthopedic surgeon Dr. Prieto Amador he was started on Fosamax, he recalls it was switched to [facility] [place] monthly, then he needed dental implants so the bisphosphonates were stopped and were never resumed after wards. As he never followed up with the orthopedic doctor again. He also remembers that his dental implant was not successful . He has taken the following medications for his Osteoporosis:Fosamax     He has no history of fracture of long bones of upper or lower legs. He has  history of vertebral fractures T8 after a fall from ladder in 2009( fall 6-8 feet )   . He has approximately 3 inches height loss as compared to his  young age. He has underwent C-spine fusion surgery as well as lumbar spine fusion surgery. He  does  take vitamin D supplements. + MVI. He  has  1-2 daily servings of cheese, milk or yogurt daily. He exercises regularly. He  has no history of long term steroids, chemotherapy, multiple myeloma, prolonged immobilization, liver or kidney disease, malabsorption, organ transplant, or immunosuppressive medications. No history of diabetes. No history of any other agents with adverse effects on bony skeleton. He is not prone to falls and no history of recent falls. No history of rheumatoid arthritis or other autoimmune disorders. positive history of kidney stones in the past once . No history of proximal muscle weakness, thinning of skin, easy bruisability, centripetal accumulation of fat and excessive weight gain recently to suggest Cushing's syndrome. He does not smoke. No excessive alcohol use. There is no parental history of hip fracture. No family history of Osteoporosis. He had dental implants and stopped fosamax a year before that and never resumed it  and there is no upcoming dental surgeries planned.     He has severe OA s/p rt knee replacement He has spinal stenosis   He has neck surgery at C spine level Fusion done   He has lower lumbar spine surgery   Also diagnosed with  Parkinson and is having trouble walking   He had carpal tunnel surgery done in the past as well    Neurosurgery visits revealed  cervical myelopathy s/p C5-T1 posterior decompression fixation and fusion. . There was concern of hardware failure on cervical x-ray. He was told that he would require revision of these fusion surgeries in future but the quality of bone during these operations were was found to be week and hence was referred for osteoporosis    Family hx of Geronimo Brady ,son has Graves and one of his sisters have severe Graves Orbitopathy and required surgical decompression of her eyes patient's thyroid receptor antibodies were negative and thyroid fx test are within normal limits     --- patient underwent successful revision of C2-T2 posterior fusion on June 2019 per Dr. Batsheva Worley    Interim history  Aug 2020 , patient started  Forteo injections Setember 2019 has not noticed any side effects from the medication denies any recent fractures denies any kidney stones.   He has been using testosterone gel and has noted some improvement in his fatigue but could not tell a significant difference in his symptoms  He had back surgery in feb 2020 with some residual back pain ---still using a walker   No new complaints         Past Medical History:   Diagnosis Date    Migraine     cluster     Parkinson disease ([address]      Past Surgical History:   Procedure Laterality Date    CARPAL TUNNEL RELEASE      x2 R, x1 L     COLONOSCOPY N/A 10/2/2020    COLONOSCOPY POLYPECTOMY SNARE/COLD BIOPSY performed by Genoveva Wolff MD at [facility] 54      childhood & at 24 (L) side     KNEE ARTHROSCOPY Left     KNEE SURGERY Right     LUMBAR FUSION      NECK SURGERY      x2    NECK SURGERY  06/21/2019    C2-T2 fusion     TONSILLECTOMY        Social History     Socioeconomic History    Marital status:      Spouse name: Not on file    Number of children: Not on file    Years of education: Not on file    Highest education level: Not on file   Occupational History    Not on file   Tobacco Use    Smoking status: Former Smoker    Smokeless tobacco: Never Used   Vaping Use    Vaping Use: Never used   Substance and Sexual Activity    Alcohol use: Never    Drug use: Never    Sexual activity: Not on file   Other Topics Concern    Not on file   Social History Narrative    Not on file     Social Determinants of Health     Financial Resource Strain:     Difficulty of Paying Living Expenses:    Food Insecurity:     Worried About [address] in the Last Year:     [address] in the Last Year:    Transportation Needs:     Lack of Transportation (Medical):  Lack of Transportation (Non-Medical):    Physical Activity:     Days of Exercise per Week:     Minutes of Exercise per Session:    Stress:     Feeling of Stress :    Social Connections:     Frequency of Communication with Friends and Family:     Frequency of Social Gatherings with Friends and Family:     Attends [employer] Services:     Active Member of Clubs or Organizations:     Attends Club or Organization Meetings:     Marital Status:    Intimate Partner Violence:     Fear of Current or Ex-Partner:     Emotionally Abused:     Physically Abused:     Sexually Abused:      Family History   Problem Relation Age of Onset    Osteoporosis Mother     Cancer Father         skin     Prostate Cancer Brother 77     Prior to Admission medications    Medication Sig Start Date End Date Taking? Authorizing Provider   alendronate (FOSAMAX) 70 MG tablet Take 1 tablet by mouth every 7 days Take once per week in the morning with a full glass of water, on an empty stomach, and do not take anything else by mouth or lie down for the next 30 minutes.  8/23/21  Yes Sarah Sigala MD   testosterone (ANDROGEL; TESTIM) 50 MG/5GM (1%) GEL 1% gel APPLY 1 PACK ONCE DAILY  3/8/21 8/23/21 Yes Angelina Bourgeois MD   Ascorbic Acid (VITAMIN C PO) Take by mouth   Yes Historical Provider, MD   cyclobenzaprine (FLEXERIL) 5 MG tablet TAKE 1 TABLET BY MOUTH THREE TIMES A DAY AS NEEDED FOR MUSCLE SPASM 8/5/20  Yes Historical Provider, MD   carbidopa-levodopa (SINEMET CR)  MG per extended release tablet Pt takes 1 tab at night 4/13/20  Yes Historical Provider, MD   gabapentin (NEURONTIN) 300 MG capsule 300 mg in AM and lunch time, 600 mg QHS  Patient taking differently: 600 mg 4 times daily. Pt takes 600 mg 3 times daily 8/20/19 8/23/21 Yes Bruno Goodrich MD   carbidopa-levodopa (SINEMET)  MG per tablet Pt take 4 tabs four times a day. 2/28/19  Yes Historical Provider, MD   Cholecalciferol (VITAMIN D PO) Take 2,000 Units by mouth daily  12/31/18  Yes Historical Provider, MD     No Known Allergies   OBJECTIVE:  There were no vitals taken for this visit.        Constitutional: no acute distress, well appearing and well nourished  Psychiatric: oriented to person, place and time, judgement and insight and normal, recent and remote memory intact and mood and affect are normal  Skin: skin and subcutaneous tissue is normal without visible mass,   Head and Face: visual inspection  of head and face revealed no abnormalities  Eyes: visual inspection showed no lid or conjunctival swelling, erythema or discharge, pupils are normal, equal, round  Ears/Nose: external inspection of ears and nose revealed no abnormalities, hearing is grossly normal  Oropharynx/Mouth/Face: lips, tongue and gums appear  normal with no lesions, the voice quality was normal  Neck: neck appears symmetric, with no visible masses,   Pulmonary: no increased work of breathing or signs of respiratory distress,  Musculoskeletal: normal on inspection    Neurological: normal coordination and normal general cortical function        Assessment/Plan:    --- Age-related osteoporosis  Started forteo sept 2019 takes daily   Will stop Forteo and start Fosamax weekly   We will repeat DEXA now   Orders are already placed   Today we discussed that after stopping  Forteo he will start antiresorptive's at that time patient had already taken Fosamax for approximately 2 to 3 years in the past he finds it hard to take it weekly and would like an injectable we will make that decision at 5 to 6 months after reviewing his DEXA scan. Last DEXA scan was in March of 2019 done at [facility] which shows lumbar spine T score of -1.3, left hip T score of 0.2 which is within normal limits, right femoral neck has a T score of -1.6 which is osteopenic. --- Patient has  prior history of thoracic spine compression fracture. --- his 24-hr urine calcium and creatinine was normal at 330 for on March 2019. Thyroid function test as well as parathyroid hormone levels were also within normal limits . His celiac disease panel was negative and bone turnover markers were not done although they were ordered. .    Patient was counseled on adequate calcium and Vitamin D intake and on fall precautions to minimize fracture risk. ---Advised to take a total of 1200 mg of elemental calcium (including diet and supplements). He  was provided with written information on sources of dietary calcium and general advice on maintaining optimal skeletal health. We also discussed the side effects and contraindications of Forteo therapy including increased risk of osteosarcoma in animal studies. Patient has no apparent contraindications to [facility] therapy     ---Hypogonadism with inappropriately normal FSH and LH    Patient was started on AndroGel and his last  testosterone level was 412in dec 2020   will stay on the current dose  His PSA was within normal limits which was done on April third 2019  PSa 0.84 in December 2020  Patient was advised to have repeat testosterone level drawn 1 month after taking the medication    --. Parkinson disease   He has been on medication for years   Stable follows with neurology. Also had Kamille's syndrome and cluster migraine headaches    --Severe  Arthritis  Follows withDr Kareen Gibson, he is now on Plaquenil. --. Spinal stenosis at L4-L5 level  Status post decompression fusion  Since last visit patient underwent successful revision of C2-T2 posterior fusion on June 2019 per Dr. Cezar Patel         Reviewed and/or ordered clinical lab results Yes  Reviewed and/or ordered radiology tests Yes  Reviewed and/or ordered other diagnostic tests Yes  Discussed test results with performing physician Yes  Made a decision to obtain old records Yes  Reviewed and summarized old records Yes      Paulina Jerry was counseled regarding symptoms of current diagnosis, course and complications of disease if inadequately treated, side effects of medications, diagnosis, treatment options, and prognosis, risks, benefits, complications, and alternatives of treatment, labs, imaging and other studies and treatment targets and goals. He understands instructions and counseling. These diagnosis were discussed and reviewed with the patient including the advantages of drug therapy. He was counseled at this visit on the following: diabetes complication prevention and foot care. TELEHEALTH EVALUATION -- Audio/Visual (During UHIAW-98 public health emergency)  Pursuant to the emergency declaration under the [address] [facility] waiver authority and the [employer] Act, this Virtual  Visit was conducted, with patient's consent, to reduce the patient's risk of exposure to COVID-19 and provide care for  patient. Services were provided through a video synchronous discussion virtually to substitute for in-person clinic visit. Patient's location : home     Patient provided verbal consent to use the video visit.    Total time spent :25 min  Reviewing the chart, conducting an interview, performing a limited exam by video and educating the patient on my assessment plan. Return in about 6 months (around 2/23/2022). Please note that some or all of this report was generated using voice recognition software. Please notify me in case of any questions about the content of this document, as some errors in transcription may have occurred .

## 2021-08-23 NOTE — TELEPHONE ENCOUNTER
Call from [address] [place] [address] Drive stating they need a new dexa order put in epic w/ a different dx.  Needs changed to the diagnosis as last yr so the insurance will cover the test. Insurance will not cover diagnosis that's list listed as \"unspecified\" so she said to use M81.0    CB# for [facility] Scheduling [phone]

## 2021-08-23 NOTE — TELEPHONE ENCOUNTER
Diagnosis code changed and called [facility] [address] to make them aware. No other questions or concerns at this time.

## 2021-08-24 PROBLEM — M80.00XA AGE-RELATED OSTEOPOROSIS WITH CURRENT PATHOLOGICAL FRACTURE: Status: ACTIVE | Noted: 2021-08-24

## 2021-08-24 PROBLEM — R97.20 ELEVATED PROSTATE SPECIFIC ANTIGEN (PSA): Status: ACTIVE | Noted: 2021-08-24

## 2021-08-24 PROBLEM — E29.1 HYPOGONADISM IN MALE: Status: ACTIVE | Noted: 2021-08-24

## 2021-09-20 ENCOUNTER — HOSPITAL ENCOUNTER (OUTPATIENT)
Dept: GENERAL RADIOLOGY | Age: 66
Discharge: HOME | End: 2021-09-20
Payer: MEDICARE

## 2021-09-20 DIAGNOSIS — M81.0 AGE-RELATED OSTEOPOROSIS WITHOUT CURRENT PATHOLOGICAL FRACTURE: ICD-10-CM

## 2021-09-20 PROCEDURE — 77080 DXA BONE DENSITY AXIAL: CPT

## 2021-10-25 NOTE — PROGRESS NOTES
ACCS Progress Note    I was called to consider the patient for potential ICU transfer by Dr. Kovacs for persistent hypotension throughout the day unresponsive to IVF and midodrine.     Mr. Davide Amezcua is a 45 year old male known to the service who was admitted on 9/30/2021 with AoCLF d/t alcoholic hepatitis superimposed on cirrhosis with ARF d/t presumed HRS. While in the ICU he was found to have CMV viremia as well as multiple acute and subacute ischemic/embolic strokes. He was transferred to the floor where he has continued on dialysis with no apparent improvements in functional status.     This morning he was found to be hypotensive. Fluids and midodrine were given with transient improvement. He did receive metoprolol overnight at 2100 (37.5mg). I arrived to the room at 1750. On my assessment the patient opens eyes to sternal rub but otherwise is obtunded and does not interact with my exam. He is currently receiving 500NS bolus. He is in a flutter/afib with -115 and BP is 100-120 with MAP 70-75 on NIBP on R arm. Bedside POCUS difficult d/t body habitus but IVC is approximately 2cm without respiratory variation. LV and RV excursion is grossly normal (although less hyperdynamic than would expect with liver failure). Bilateral atrium are dilated/enlarged. I asked the ICU nurses to come and access permcath to run iSTAT which revealed Hgb 7.7 (unchanged from this AM), normalized lactic acid, and hypo-ionized calcemia, and SvO2 of 54.     Unfortunately, Mr. Davide Amezcua is dying from his liver disease. He has MSOF with CMV viremia and multiple ischemic strokes with very poor functional status - all of which correlate with an extremely high mortality risk. Palliative care has been following and extensive goals of care have been had with the family. Using the , I discussed with the patient's sister and brother in law (via speaker phone). I explained that Hung is dying from his liver disease and he  Left VM for patient to call office back to schedule a f/u appt. Reminder letter and lab orders placed in the mail. is unlikely to survive this hospital stay. The ICU has very little more to offer and will not change the fact that he is dying. They acknowledged hearing this several times; however, they feel that Hung has been responsive to them and would want to fight against all odds. I acknowledged that this is a difficult situation; however, he continues to decline despite aggressive medical care and while we \"can\" do many interventions they will cause him pain and provide no benefit or change the fact that he is dying from his liver disease and at some point his body will make the decision for him.     I also discussed the case in depth with Dr. Schroeder. Unfortunately, ICU transfer will be non beneficial/futile for the patient.     Assessment:  -- AoCLF d/t alcoholic hepatitis and cirrhosis  -- ARF d/t hepatorenal disease   -- on dialysis  -- CMV viremia   -- Altered mental status - multiple ischemic strokes  -- Hypotension  -- Debility     Plan:   -- I discussed with Dr. Kovacs and   -- Suggest supplementing calcium   -- Consider adrenal insufficiency and starting empiric stress steroids  -- Hold all antiarryhtmic medications  -- Suggest continuing midodrine   --  No transfer to the ICU is indicated, suggest hospice if any further decline     ACCS Attestation     Mr. Davide Amezcua is critically ill as documented above. I evaluated the patient and reviewed imaging and laboratory data. Critical care services I provided [id number] > 50 minutes not including time allocated for procedures.     I discussed the above patient and treatment plan with Brian Schroeder MD.     [facility]  Preferred Contact: Epic Secure Chat

## 2021-11-05 NOTE — PROGRESS NOTES
Billy Hinds MD  [facility]) Physicians - Rheumatology    [x] [facility] HOSPITAL:  [address], [address] [facility] [] [place] Office:  [address], [address]   Office: [phone]  Fax: [phone] PROGRESS NOTE    SUBJECTIVE:    Background:   Yuliet Tian is a 78 y.o. 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 Naveen Monreal). PMHx pertinent for chronic neck and LBP w/ cervical myelopathy s/p C5-T1 posterior decompression fixation and fusion w/ concern for hardware failure s/p revision C2-T2 posterior fusion on 6/21/19, spinal stenosis and degenerative arthritis of the L-spine w/ unstable L4-5 fusion s/p lumbar fusion on 2/20/20, and Parkinson's disease.     Current rheum related meds:   mg daily: started in 8/19  Gabapentin and Flexeril per [facility]  Fosamax: per Endocrinology     Prior rheum meds:  Colchicine 0.6 mg daily: took from 4/19 - 8/19, ineffective  Robaxin 750 mg BID  Fosamax, Boniva, d/c'ed d/t dental implants  S/p Forteo per Endocrinology    Past medical/surgical history, medications and allergies are reviewed and updated as appropriate. Interval Hx:   Pt denies any arthralgias in his hand or wrist joints. Denies prolonged morning stiffness. He tells me he gets 2 arthritis flares every yr. He has not taken any steroids recently. He reports compliance w/ HCQ. Pt states he recently received a 3rd hyaluronic injection to his L knee. He was told by Ortho that he would not be a good surgical candidate for TKR d/t his limited mobility. He is following w/  Pain Management for his chronic LBP. He remains on Gabapentin and Flexeril. He tells me he did not respond to [facility]. He is scheduled for a spinal stimulator placement, he is hoping that this will work. He uses a Parkinson's walker to ambulate. Denies any recent falls.  He is following w/ Endocrinology for osteoporosis. He tells me he is currently taking Fosamax. Rheumatologic ROS:  Constitutional: denies fever/chills, night sweats, unintentional weight loss  Integumentary: denies photosensitivity, rash, patchy alopecia, or Sx of Raynaud's phenomenon  Cardiovascular: denies CP, palpitations, Hx of pericardial effusion or pericarditis  Respiratory: denies SOB, cough, hemoptysis, or pleurisy  Musculoskeletal:  refer to above HPI     No Known Allergies    Past Medical History:        Diagnosis Date    Migraine     cluster     Parkinson disease ([facility] [address]        Past Surgical History:        Procedure Laterality Date    CARPAL TUNNEL RELEASE      x2 R, x1 L     COLONOSCOPY N/A 10/2/2020    COLONOSCOPY POLYPECTOMY SNARE/COLD BIOPSY performed by Lucila Jackson MD at [place] [address]      childhood & at 17 (L) side     KNEE ARTHROSCOPY Left     KNEE SURGERY Right     LUMBAR FUSION      NECK SURGERY      x2    NECK SURGERY  06/21/2019    C2-T2 fusion     TONSILLECTOMY         Medications:    Current Outpatient Medications   Medication Sig Dispense Refill    gabapentin (NEURONTIN) 600 MG tablet TAKE 2 TABLETS BY MOUTH THREE TIMES A DAY      hydroxychloroquine (PLAQUENIL) 200 MG tablet TAKE 1 TABLET BY MOUTH TWO TIMES A  tablet 1    predniSONE (DELTASONE) 10 MG tablet Take 2 tablets by mouth daily for 10 days, THEN 1 tablet daily for 10 days, THEN 0.5 tablets daily for 10 days. 35 tablet 2    alendronate (FOSAMAX) 70 MG tablet Take 1 tablet by mouth every 7 days Take once per week in the morning with a full glass of water, on an empty stomach, and do not take anything else by mouth or lie down for the next 30 minutes.  4 tablet 3    testosterone (ANDROGEL; TESTIM) 50 MG/5GM (1%) GEL 1% gel APPLY 1 PACK ONCE DAILY  150 g 3    Ascorbic Acid (VITAMIN C PO) Take by mouth      cyclobenzaprine (FLEXERIL) 5 MG tablet TAKE 1 TABLET BY MOUTH THREE TIMES A DAY AS NEEDED FOR MUSCLE SPASM      carbidopa-levodopa (SINEMET CR)  MG per extended release tablet Pt takes 1 tab at night      carbidopa-levodopa (SINEMET)  MG per tablet Pt take 4 tabs four times a day. 5    Cholecalciferol (VITAMIN D PO) Take 2,000 Units by mouth daily       gabapentin (NEURONTIN) 300 MG capsule 300 mg in AM and lunch time, 600 mg QHS (Patient not taking: Reported on 11/9/2021) 360 capsule 0     No current facility-administered medications for this visit. OBJECTIVE:  Physical Exam:  /80   Pulse 90   Ht 5' 7\" (1.702 m)   Wt 200 lb (90.7 kg)   SpO2 99%   BMI 31.32 kg/m²     GEN: AAOx3, in NAD, accompanied by wife  HEAD: normocephalic, atraumatic  EYES: no injection or icterus  CVS: RRR  LUNGS: in no acute respiratory distress, CTAB  MSK:   Upper extremities:              Hands: b/l MCP joints w/o synovitis NTTP, there is chronic bony enlargement w/o synovitis of the b/l PIP and DIP joints NTTP, full fist formation w/ good  strength              Wrist: there is extensive bony hypertrophy w/ chronic synovial proliferation no active synovits of the b/l wrist joints (L>R), there is significant fusion of the wrist joints, wrist joints NTTP              Elbow: no synovitis or bursitis, FROM  Lower extremities:              Knees: s/p R TKR, L knee w/ trace effusion and bony enlaregment w/ crepitus              Ankles: no synovitis, FROM              Feet: no toe swelling or pain or warmth on palpation w/ FROM, negative MTP squeeze test  INTEGUMENT: no rash or psoriatic lesions, no petechiae, bruises, or palpable purpura, no patchy alopecia, no nail or periungual changes, no clubbing or digital ulcers    DATA:  Labs:   I personally reviewed interval labs and discussed w/ the pt in detail which showed:    Lab Results   Component Value Date    WBC 8.7 05/11/2021    HGB 14.9 05/11/2021    HCT 45.1 05/11/2021    MCV 95.6 05/11/2021     05/11/2021    LYMPHOPCT 19.3 05/11/2021    RBC 4.72 05/11/2021 MCH 31.7 05/11/2021    MCHC 33.2 05/11/2021    RDW 14.6 05/11/2021     Lab Results   Component Value Date     12/08/2020    K 4.2 12/08/2020     12/08/2020    CO2 28 12/08/2020    BUN 12 12/08/2020    CREATININE 0.90 12/08/2020    GLUCOSE 83 12/08/2020    CALCIUM 9.7 12/08/2020    PROT 7.1 12/08/2020    LABALBU 4.3 12/08/2020    BILITOT 0.6 12/08/2020    ALKPHOS 75 12/08/2020    AST 10 (L) 12/08/2020    ALT <3 (L) 12/08/2020    LABGLOM >60 03/11/2019    GFRAA >60 03/11/2019    AGRATIO 1.4 03/11/2019    GLOB 2.9 03/11/2019     Lab Results   Component Value Date    VITD25 30.5 12/08/2020     Lab Results   Component Value Date    CRP 0.8 08/20/2019    CRP 0.5 04/04/2019     Lab Results   Component Value Date    SEDRATE 8 08/20/2019    SEDRATE 7 04/04/2019     No results found for: LABURIC    Negative RF, CCP (4/4/19)  Negative HLA B27 (4/4/19)  Negative hepatitis B and C (4/4/19)  Negative SPEP and UPEP (4/4/19)  TSH, T4, thyroid peroxidase, anti-thyroglob Ab negative (3/11/19)  PTH wnl (3/11/19)  HgbA1c 6.1 (3/11/19)  Serum Mg wnl (6/13/19)  Ferritin and transferrin wnl (6/13/19)  Hemochromatosis genetic test (6/17/19): negative H63D, C282Y, S65C    Imaging:  I personally reviewed interval imaging and discussed w/ the pt in detail which included:    CT C-spine (3/12/19):  Postoperative findings of multilevel anterior and posterior fusion and laminectomy w/ significant loosening and extrusion of the posterior fusion hardware at C5, C6, and T1. The interbody grafts at C4-5 and C5-6 are subtotally incorporated with minimal subtle questionable lucency around the C4 screws, no definite screw loosening at C5 or C6. Advanced multilevel cervical degenerative disc disease and facet arthrosis causing multilevel neural foraminal narrowing. Other level specific details are discussed above and on the prior MRI report.  The cord compression and cord signal abnormality at C6-7 is much better demonstrated on MRI.      X-ray C-spine 8/6/19  IMPRESSION:  Stable postoperative changes from C4-C6 ACDF and C2-T2 posterior fusion. No hardware complications seen.      CT L-spine (3/12/19):  Postoperative findings of L4-S1 fusion again identified with similar appearance of bilateral L4 pedicle screw loosening, with subtle increased anterolisthesis at L4-5. Asymmetric widening of a left facet defect at L4-5 is similar to slightly more evident, with suspected pseudoarthrosis along the medial margin of a previously ankylosed right L4-5 facet joint. Unstable L4-5 fusion is again suspected. Definite loosening of the right L5 pedicle screw is not seen. The L5-S1 fusion appears solid. Multilevel degenerative disc disease and facet stenosis with other level specific details discussed above. The thecal sac is again obscured at the postoperative sites.      X-ray L-spine (12/17/19): IMPRESSION:  1.  Grade 1 spondylolisthesis at L4-5 and mild retrolisthesis at L3-4. No instability with flexion and extension.     X-rays (4/4/19):  B/l hands:   Severe degenerative changes about the wrists including the intercarpal spaces and radiocarpal spaces w/ SLAC wrist deformity bilaterally, chronic.  TFC complex calcifications are noted.  Crystal disease arthropathy is a primary consideration.      L knee:   Bicompartmental degenerative changes are noted.      SI joints:   No erosion or ankylosis appreciated.      Procedures:   EMG (10/17/18): Impression:   1. Electrodiagnostic evidence is consistent with severe right carpal tunnel syndrome. There is median sensory and motor neuropathy and evidence of chronic denervation in the abductor pollicis brevis. 2. Electrodiagnostic evidence is consistent with moderate right ulnar sensory and motor neuropathy, with amplitude drop across the elbow segment. No evidence of acute or chronic denervation in the first dorsal interosseus and flexor carpi ulnaris.    3. Electrodiagnostic evidence is suggestive of chronic right C6 motor radiculopathy. 4. No electrodiagnostic evidence of right brachial plexopathy, peripheral neuropathy, or myopathy in nerves / muscles tested. Above results were discussed w/ the pt in detail during today's visit. ASSESSMENT/PLAN:   1. Calcium pyrophosphate arthropathy of multiple sites  Assessment & Plan:  - remains well controlled on HCQ, no active synovitis appreciated on exam. Cont same dose HCQ. Discussed taking low dose Prednisone taper for CPPD flares. Orders:  -     hydroxychloroquine (PLAQUENIL) 200 MG tablet; TAKE 1 TABLET BY MOUTH TWO TIMES A DAY, Disp-180 tablet, R-1Normal  -     predniSONE (DELTASONE) 10 MG tablet; Take 2 tablets by mouth daily for 10 days, THEN 1 tablet daily for 10 days, THEN 0.5 tablets daily for 10 days. , Disp-35 tablet, R-2Normal  2. High risk medication use  Assessment & Plan:  - repeat CBC w/ diff now for long term HCQ use, he will be due for his annual eye exam in December. Orders:  -     CBC Auto Differential; Future  3. Primary osteoarthritis of left knee  Assessment & Plan:  - following w/ Ortho, did not respond to IAC injections, recently received 3rd hyaluronic acid injection. He was told that he is a not a good surgical candidate for TKR. Cont Gabapentin and Flexeril per Pain Management. Orders:  -     predniSONE (DELTASONE) 10 MG tablet; Take 2 tablets by mouth daily for 10 days, THEN 1 tablet daily for 10 days, THEN 0.5 tablets daily for 10 days. , Disp-35 tablet, R-2Normal    Return in about 6 months (around 5/9/2022) for lab result discussion and treatment plan, medication monitoring. The risks and benefits of my recommendations, as well as other treatment options, benefits and side effects were discussed with the patient today. Questions were answered. NOTE: This report is transcribed by using voice recognition software dragon.  Every effort is made to ensure accuracy; however, inadvertent computerized  transcription errors may be present.

## 2021-11-09 ENCOUNTER — OFFICE VISIT (OUTPATIENT)
Dept: RHEUMATOLOGY | Age: 66
End: 2021-11-09
Payer: MEDICARE

## 2021-11-09 VITALS
SYSTOLIC BLOOD PRESSURE: 124 MMHG | OXYGEN SATURATION: 99 % | HEART RATE: 90 BPM | BODY MASS INDEX: 31.39 KG/M2 | WEIGHT: 200 LBS | HEIGHT: 67 IN | DIASTOLIC BLOOD PRESSURE: 80 MMHG

## 2021-11-09 DIAGNOSIS — M11.89 CALCIUM PYROPHOSPHATE ARTHROPATHY OF MULTIPLE SITES: Primary | ICD-10-CM

## 2021-11-09 DIAGNOSIS — M17.12 PRIMARY OSTEOARTHRITIS OF LEFT KNEE: ICD-10-CM

## 2021-11-09 DIAGNOSIS — Z79.899 HIGH RISK MEDICATION USE: ICD-10-CM

## 2021-11-09 PROBLEM — M17.0 PRIMARY OSTEOARTHRITIS OF BOTH KNEES: Status: ACTIVE | Noted: 2021-11-09

## 2021-11-09 LAB
BASOPHILS ABSOLUTE: 0 K/UL (ref 0–0.2)
BASOPHILS RELATIVE PERCENT: 0.4 %
EOSINOPHILS ABSOLUTE: 0.1 K/UL (ref 0–0.6)
EOSINOPHILS RELATIVE PERCENT: 1.5 %
HCT VFR BLD CALC: 46.2 % (ref 40.5–52.5)
HEMOGLOBIN: 15.3 G/DL (ref 13.5–17.5)
LYMPHOCYTES ABSOLUTE: 1.3 K/UL (ref 1–5.1)
LYMPHOCYTES RELATIVE PERCENT: 17.1 %
MCH RBC QN AUTO: 32.3 PG (ref 26–34)
MCHC RBC AUTO-ENTMCNC: 33 G/DL (ref 31–36)
MCV RBC AUTO: 98 FL (ref 80–100)
MONOCYTES ABSOLUTE: 0.9 K/UL (ref 0–1.3)
MONOCYTES RELATIVE PERCENT: 12.1 %
NEUTROPHILS ABSOLUTE: 5.2 K/UL (ref 1.7–7.7)
NEUTROPHILS RELATIVE PERCENT: 68.9 %
PDW BLD-RTO: 13.9 % (ref 12.4–15.4)
PLATELET # BLD: 317 K/UL (ref 135–450)
PMV BLD AUTO: 7.9 FL (ref 5–10.5)
RBC # BLD: 4.72 M/UL (ref 4.2–5.9)
WBC # BLD: 7.5 K/UL (ref 4–11)

## 2021-11-09 PROCEDURE — 4040F PNEUMOC VAC/ADMIN/RCVD: CPT | Performed by: INTERNAL MEDICINE

## 2021-11-09 PROCEDURE — 99214 OFFICE O/P EST MOD 30 MIN: CPT | Performed by: INTERNAL MEDICINE

## 2021-11-09 PROCEDURE — G8427 DOCREV CUR MEDS BY ELIG CLIN: HCPCS | Performed by: INTERNAL MEDICINE

## 2021-11-09 PROCEDURE — 3017F COLORECTAL CA SCREEN DOC REV: CPT | Performed by: INTERNAL MEDICINE

## 2021-11-09 PROCEDURE — 1036F TOBACCO NON-USER: CPT | Performed by: INTERNAL MEDICINE

## 2021-11-09 PROCEDURE — 1123F ACP DISCUSS/DSCN MKR DOCD: CPT | Performed by: INTERNAL MEDICINE

## 2021-11-09 PROCEDURE — G8417 CALC BMI ABV UP PARAM F/U: HCPCS | Performed by: INTERNAL MEDICINE

## 2021-11-09 PROCEDURE — G8484 FLU IMMUNIZE NO ADMIN: HCPCS | Performed by: INTERNAL MEDICINE

## 2021-11-09 RX ORDER — PREDNISONE 10 MG/1
TABLET ORAL
Qty: 35 TABLET | Refills: 2 | Status: SHIPPED | OUTPATIENT
Start: 2021-11-09 | End: 2021-12-09

## 2021-11-09 RX ORDER — HYDROXYCHLOROQUINE SULFATE 200 MG/1
TABLET, FILM COATED ORAL
Qty: 180 TABLET | Refills: 1 | Status: SHIPPED | OUTPATIENT
Start: 2021-11-09 | End: 2022-05-11 | Stop reason: SDUPTHER

## 2021-11-09 RX ORDER — GABAPENTIN 600 MG/1
TABLET ORAL
COMMUNITY
Start: 2021-10-28

## 2021-11-09 RX ORDER — HYDROXYCHLOROQUINE SULFATE 200 MG/1
TABLET, FILM COATED ORAL
COMMUNITY
Start: 2021-09-10 | End: 2021-11-09 | Stop reason: SDUPTHER

## 2021-11-09 NOTE — ASSESSMENT & PLAN NOTE
- following w/ Ortho, did not respond to IAC injections, recently received 3rd hyaluronic acid injection. He was told that he is a not a good surgical candidate for TKR. Cont Gabapentin and Flexeril per Pain Management.

## 2021-11-09 NOTE — ASSESSMENT & PLAN NOTE
- remains well controlled on HCQ, no active synovitis appreciated on exam. Cont same dose HCQ. Discussed taking low dose Prednisone taper for CPPD flares.

## 2022-01-27 ENCOUNTER — TELEPHONE (OUTPATIENT)
Dept: ENDOCRINOLOGY | Age: 67
End: 2022-01-27

## 2022-01-27 DIAGNOSIS — M81.0 AGE-RELATED OSTEOPOROSIS WITHOUT CURRENT PATHOLOGICAL FRACTURE: Primary | ICD-10-CM

## 2022-01-27 RX ORDER — TERIPARATIDE 250 UG/ML
20 INJECTION, SOLUTION SUBCUTANEOUS DAILY
Qty: 2.24 ML | Refills: 11 | Status: SHIPPED | OUTPATIENT
Start: 2022-01-27 | End: 2022-02-07

## 2022-01-27 NOTE — TELEPHONE ENCOUNTER
Fax from [facility] rx refill request for Forteo    LOV   8-23-21  FOV   2-7-22

## 2022-01-28 NOTE — TELEPHONE ENCOUNTER
Forteo PA submitted today 1/28/2022    RADHA Flynn Romeo: [id number] - PA Case ID: [id number] - Rx #: [id number]   STATUS: PENDING

## 2022-02-02 DIAGNOSIS — M80.00XA AGE-RELATED OSTEOPOROSIS WITH CURRENT PATHOLOGICAL FRACTURE, INITIAL ENCOUNTER: ICD-10-CM

## 2022-02-02 DIAGNOSIS — E29.1 HYPOGONADISM IN MALE: ICD-10-CM

## 2022-02-02 LAB
A/G RATIO: 1.5 (ref 1.1–2.2)
ALBUMIN SERPL-MCNC: 4.1 G/DL (ref 3.4–5)
ALP BLD-CCNC: 79 U/L (ref 40–129)
ALT SERPL-CCNC: <5 U/L (ref 10–40)
ANION GAP SERPL CALCULATED.3IONS-SCNC: 16 MMOL/L (ref 3–16)
AST SERPL-CCNC: 11 U/L (ref 15–37)
BILIRUB SERPL-MCNC: 0.3 MG/DL (ref 0–1)
BUN BLDV-MCNC: 12 MG/DL (ref 7–20)
CALCIUM SERPL-MCNC: 9.3 MG/DL (ref 8.3–10.6)
CHLORIDE BLD-SCNC: 102 MMOL/L (ref 99–110)
CO2: 23 MMOL/L (ref 21–32)
CREAT SERPL-MCNC: 1 MG/DL (ref 0.8–1.3)
GFR AFRICAN AMERICAN: >60
GFR NON-AFRICAN AMERICAN: >60
GLUCOSE BLD-MCNC: 90 MG/DL (ref 70–99)
POTASSIUM SERPL-SCNC: 4.2 MMOL/L (ref 3.5–5.1)
SODIUM BLD-SCNC: 141 MMOL/L (ref 136–145)
TOTAL PROTEIN: 6.9 G/DL (ref 6.4–8.2)
TSH SERPL DL<=0.05 MIU/L-ACNC: 2.94 UIU/ML (ref 0.27–4.2)
VITAMIN D 25-HYDROXY: 58.2 NG/ML

## 2022-02-07 ENCOUNTER — OFFICE VISIT (OUTPATIENT)
Dept: ENDOCRINOLOGY | Age: 67
End: 2022-02-07
Payer: MEDICARE

## 2022-02-07 VITALS
DIASTOLIC BLOOD PRESSURE: 83 MMHG | WEIGHT: 209 LBS | HEIGHT: 67 IN | HEART RATE: 83 BPM | RESPIRATION RATE: 16 BRPM | BODY MASS INDEX: 32.8 KG/M2 | SYSTOLIC BLOOD PRESSURE: 120 MMHG

## 2022-02-07 DIAGNOSIS — G20 PARKINSON DISEASE (HCC): ICD-10-CM

## 2022-02-07 DIAGNOSIS — E29.1 HYPOGONADISM IN MALE: ICD-10-CM

## 2022-02-07 DIAGNOSIS — M48.061 SPINAL STENOSIS AT L4-L5 LEVEL: ICD-10-CM

## 2022-02-07 DIAGNOSIS — M80.00XA AGE-RELATED OSTEOPOROSIS WITH CURRENT PATHOLOGICAL FRACTURE, INITIAL ENCOUNTER: Primary | ICD-10-CM

## 2022-02-07 PROCEDURE — 4040F PNEUMOC VAC/ADMIN/RCVD: CPT | Performed by: INTERNAL MEDICINE

## 2022-02-07 PROCEDURE — 1123F ACP DISCUSS/DSCN MKR DOCD: CPT | Performed by: INTERNAL MEDICINE

## 2022-02-07 PROCEDURE — G8484 FLU IMMUNIZE NO ADMIN: HCPCS | Performed by: INTERNAL MEDICINE

## 2022-02-07 PROCEDURE — 1036F TOBACCO NON-USER: CPT | Performed by: INTERNAL MEDICINE

## 2022-02-07 PROCEDURE — G8417 CALC BMI ABV UP PARAM F/U: HCPCS | Performed by: INTERNAL MEDICINE

## 2022-02-07 PROCEDURE — 99214 OFFICE O/P EST MOD 30 MIN: CPT | Performed by: INTERNAL MEDICINE

## 2022-02-07 PROCEDURE — G8427 DOCREV CUR MEDS BY ELIG CLIN: HCPCS | Performed by: INTERNAL MEDICINE

## 2022-02-07 PROCEDURE — 3017F COLORECTAL CA SCREEN DOC REV: CPT | Performed by: INTERNAL MEDICINE

## 2022-02-07 RX ORDER — ALENDRONATE SODIUM 70 MG/1
70 TABLET ORAL
Qty: 4 TABLET | Refills: 3 | Status: SHIPPED | OUTPATIENT
Start: 2022-02-07 | End: 2022-02-07 | Stop reason: SDUPTHER

## 2022-02-07 RX ORDER — ALENDRONATE SODIUM 70 MG/1
70 TABLET ORAL
Qty: 4 TABLET | Refills: 3 | Status: SHIPPED | OUTPATIENT
Start: 2022-02-07 | End: 2022-06-27 | Stop reason: SDUPTHER

## 2022-02-07 NOTE — PROGRESS NOTES
He has spinal stenosis   He has neck surgery at C spine level Fusion done   He has lower lumbar spine surgery   Also diagnosed with  Parkinson and is having trouble walking   He had carpal tunnel surgery done in the past as well    Neurosurgery visits revealed  cervical myelopathy s/p C5-T1 posterior decompression fixation and fusion. . There was concern of hardware failure on cervical x-ray. He was told that he would require revision of these fusion surgeries in future but the quality of bone during these operations were was found to be week and hence was referred for osteoporosis    Family hx of Cleo Morse ,son has Graves and one of his sisters have severe Graves Orbitopathy and required surgical decompression of her eyes patient's thyroid receptor antibodies were negative and thyroid fx test are within normal limits     --- patient underwent successful revision of C2-T2 posterior fusion on June 2019 per Dr. Camarena File  --Patient took Forteo from September 2019 to September 2021. Interim history    He took testosterone supplement and initially noted improvement in his symptoms but has not used testosterone supplement for over 3 to 4 weeks now and does not notice significant changes in his symptoms he is willing to stay off of testosterone for another 2 months and repeat the labs to evaluate for hypogonadal axis.   He had back surgery in feb 2020 with some residual back pain ---still using a walker   No new complaints         Past Medical History:   Diagnosis Date    Migraine     cluster     Parkinson disease ([address]      Past Surgical History:   Procedure Laterality Date    CARPAL TUNNEL RELEASE      x2 R, x1 L     COLONOSCOPY N/A 10/2/2020    COLONOSCOPY POLYPECTOMY SNARE/COLD BIOPSY performed by Mally Myers MD at [facility]      childhood & at 24 (L) side     KNEE ARTHROSCOPY Left     KNEE SURGERY Right     LUMBAR FUSION      NECK SURGERY      x2    NECK SURGERY  06/21/2019 C2-T2 fusion     TONSILLECTOMY        Social History     Socioeconomic History    Marital status:      Spouse name: Not on file    Number of children: Not on file    Years of education: Not on file    Highest education level: Not on file   Occupational History    Not on file   Tobacco Use    Smoking status: Former Smoker    Smokeless tobacco: Never Used   Vaping Use    Vaping Use: Never used   Substance and Sexual Activity    Alcohol use: Never    Drug use: Never    Sexual activity: Not on file   Other Topics Concern    Not on file   Social History Narrative    Not on file     Social Determinants of Health     Financial Resource Strain:     Difficulty of Paying Living Expenses: Not on file   Food Insecurity:     Worried About [address] in the Last Year: Not on file    [address] [employer] in the Last Year: Not on file   Transportation Needs:     Lack of Transportation (Medical): Not on file    Lack of Transportation (Non-Medical):  Not on file   Physical Activity:     Days of Exercise per Week: Not on file    Minutes of Exercise per Session: Not on file   Stress:     Feeling of Stress : Not on file   Social Connections:     Frequency of Communication with Friends and Family: Not on file    Frequency of Social Gatherings with Friends and Family: Not on file    Attends Zoroastrian Services: Not on file    Active Member of [address] [place] [address] or Organizations: Not on file    Attends Club or Organization Meetings: Not on file    Marital Status: Not on file   Intimate Partner Violence:     Fear of Current or Ex-Partner: Not on file    Emotionally Abused: Not on file    Physically Abused: Not on file    Sexually Abused: Not on file   Housing Stability:     Unable to Pay for Housing in the Last Year: Not on file    Number of [place] in the Last Year: Not on file    Unstable Housing in the Last Year: Not on file     Family History   Problem Relation Age of Onset    Osteoporosis Mother hearing is grossly normal  Oropharynx/Mouth/Face: lips, tongue and gums appear  normal with no lesions, the voice quality was normal  Neck: neck appears symmetric, with no visible masses,   Pulmonary: no increased work of breathing or signs of respiratory distress,  Musculoskeletal: normal on inspection    Neurological: normal coordination and normal general cortical function        Assessment/Plan:    --- Age-related osteoporosis  Started forteo sept 2019 ---sept 2021 as per Ortho recommendation   --stop Forteo and started Fosamax weekly in sept 2021 . Will continue with Fosamax for now. --- repeat DEXA in sept 2021 was normal BMD .  --Initially therapy was started due to orthopedic recommendation as significant bone porosity was identified  ----DEXA scan was in March of 2019 done at [address] [facility] which shows lumbar spine T score of -1.3, left hip T score of 0.2 which is within normal limits, right femoral neck has a T score of -1.6 which is osteopenic. --- Patient has  prior history of thoracic spine compression fracture. --- his 24-hr urine calcium and creatinine was normal at 330 for on March 2019. Thyroid function test as well as parathyroid hormone levels were also within normal limits . His celiac disease panel was negative and bone turnover markers were not done although they were ordered. .    Patient was counseled on adequate calcium and Vitamin D intake and on fall precautions to minimize fracture risk. ---Advised to take a total of 1200 mg of elemental calcium (including diet and supplements). He  was provided with written information on sources of dietary calcium and general advice on maintaining optimal skeletal health. We also discussed the side effects and contraindications of Forteo therapy including increased risk of osteosarcoma in animal studies.  Patient has no apparent contraindications to Forteo therapy     ---Hypogonadism with inappropriately normal FSH and LH  Stopped testosterone in dec 2021 and doesn't feels any difference in symptoms. Patient will repeat his labs upon awakening in 2 to 3 months  His PSA was within normal limits which was done on April third 2019  PSa 0.84 in December 2020      --Parkinson disease   He has been on medication for years   Stable follows with neurology. Also had Kamille's syndrome and cluster migraine headaches    --Severe  Arthritis  Follows withDr Veronica Del Castillo, he is now on Plaquenil. --. Spinal stenosis at L4-L5 level  Status post decompression fusion  ---underwent successful revision of C2-posterior fusion on June 2019 per Dr. Kurt French  Spinal stimulator          Reviewed and/or ordered clinical lab results Yes  Reviewed and/or ordered radiology tests Yes  Reviewed and/or ordered other diagnostic tests Yes  Discussed test results with performing physician Yes  Made a decision to obtain old records Yes  Reviewed and summarized old records Yes      Betsy Ewing was counseled regarding symptoms of current diagnosis, course and complications of disease if inadequately treated, side effects of medications, diagnosis, treatment options, and prognosis, risks, benefits, complications, and alternatives of treatment, labs, imaging and other studies and treatment targets and goals. He understands instructions and counseling. These diagnosis were discussed and reviewed with the patient including the advantages of drug therapy. He was counseled at this visit on the following: diabetes complication prevention and foot care. I spent  30 + minutes which includes reviewing patient chart , interpreting previous lab results  , discussing and providing counseling and coordinating care of patient's multiple health issues with  the patient. Return in about 3 months (around 5/7/2022). Please note that some or all of this report was generated using voice recognition software.  Please notify me in case of any questions about the content of this document, as some errors in transcription may have occurred .

## 2022-05-06 NOTE — PROGRESS NOTES
Abbie Heimlich, MD  [facility]) Physicians - Rheumatology    [x] [facility]:  [place] [facility] Dr. Espinoza [address] [place] [address] [] [facility] 94:  [address]   Office: [phone]  Fax: [phone]     RHEUMATOLOGY PROGRESS NOTE    ASSESSMENT/PLAN:  Serafin Paris is a 75 y.o. 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 Jesus Mercado). PMHx pertinent for chronic neck and LBP w/ cervical myelopathy s/p C5-T1 posterior decompression fixation and fusion w/ concern for hardware failure s/p revision C2-T2 posterior fusion on 6/21/19, spinal stenosis and degenerative arthritis of the L-spine w/ unstable L4-5 fusion s/p lumbar fusion on 2/20/20, and Parkinson's disease.     Current rheum related meds:   mg daily: started in 8/19  Gabapentin and Flexeril per [facility]  Fosamax: per Endocrinology     Prior rheum meds:  Colchicine 0.6 mg daily: took from 4/19 - 8/19, ineffective  Robaxin 750 mg BID  Fosamax, Boniva, d/c'ed d/t dental implants  S/p Forteo per Endocrinology    1. Calcium pyrophosphate arthropathy of multiple sites  Assessment & Plan:  - pt reported well controlled joint Sx on HCQ. He had mild synovitis in his L wrist joint on exam today however he denied any pain. He is currently taking a Prednisone taper prescribed by Ortho for his knees. - cont same dose of HCQ. Orders:  -     hydroxychloroquine (PLAQUENIL) 200 MG tablet; TAKE 1 TABLET BY MOUTH TWO TIMES A DAY, Disp-180 tablet, R-1Normal  2. Primary osteoarthritis of both knees  Assessment & Plan:  - following w/ Ortho, s/p b/l TKR most recently L TKR in 4/2022.  - cont pain medications per Ortho and Pain Management. 3. High risk medication use  Assessment & Plan:  - discussed annual eye exam for HCQ toxicity monitoring.      Return in about 6 months (around 11/11/2022) for lab result discussion and treatment plan, medication monitoring. The risks and benefits of my recommendations, as well as other treatment options, benefits and side effects were discussed with the patient today. Questions were answered. NOTE: This report is transcribed by using voice recognition software dragon. Every effort is made to ensure accuracy; however, inadvertent computerized  transcription errors may be present. SUBJECTIVE:  Past medical/surgical history, medications and allergies are reviewed and updated as appropriate. Interval Hx:   Pt reports well controlled CPPD on  mg BID. He denies any significant arthralgias, joint swelling or prolonged morning stiffness in his hand or wrist joints. He denies any recent CPPD arthritis flares over the past 6 months. He tells me he's currently taking a Prednisone taper prescribed by Ortho for his knees - he has not noticed any improved joint Sx in his hand/wrist joints on Prednisone. Of note, pt recently underwent L TKR in 4/2022.     ROS:  Constitutional: denies fever/chills, night sweats, unintentional weight loss  Integumentary: denies photosensitivity, rash, patchy alopecia, or Sx of Raynaud's phenomenon  Cardiovascular: denies CP, palpitations, Hx of pericardial effusion or pericarditis  Respiratory: denies SOB, cough, hemoptysis, or pleurisy  Musculoskeletal:  refer to above HPI     No Known Allergies    Past Medical History:        Diagnosis Date    Migraine     cluster     Parkinson disease ([facility] [address]        Past Surgical History:        Procedure Laterality Date    CARPAL TUNNEL RELEASE      x2 R, x1 L     COLONOSCOPY N/A 10/2/2020    COLONOSCOPY POLYPECTOMY SNARE/COLD BIOPSY performed by Zehra Rider MD at [facility]      childhood & at 24 (L) side     KNEE ARTHROSCOPY Left     KNEE SURGERY Right     LUMBAR FUSION      NECK SURGERY      x2    NECK SURGERY  06/21/2019    C2-T2 fusion     TONSILLECTOMY         Medications:    Current Outpatient Medications Medication Sig Dispense Refill    hydroxychloroquine (PLAQUENIL) 200 MG tablet TAKE 1 TABLET BY MOUTH TWO TIMES A  tablet 1    alendronate (FOSAMAX) 70 MG tablet Take 1 tablet by mouth every 7 days Take once per week in the morning with a full glass of water, on an empty stomach, and do not take anything else by mouth or lie down for the next 30 minutes. 4 tablet 3    gabapentin (NEURONTIN) 600 MG tablet TAKE 2 TABLETS BY MOUTH THREE TIMES A DAY      cyclobenzaprine (FLEXERIL) 5 MG tablet TAKE 1 TABLET BY MOUTH THREE TIMES A DAY AS NEEDED FOR MUSCLE SPASM      carbidopa-levodopa (SINEMET CR)  MG per extended release tablet Pt takes 1 tab at night      carbidopa-levodopa (SINEMET)  MG per tablet Pt take 4 tabs four times a day. 5    Cholecalciferol (VITAMIN D PO) Take 2,000 Units by mouth daily       Ascorbic Acid (VITAMIN C PO) Take by mouth (Patient not taking: Reported on 5/11/2022)       No current facility-administered medications for this visit.         OBJECTIVE:  Physical Exam:  /80   Pulse 87   Ht 5' 6\" (1.676 m)   Wt 203 lb (92.1 kg)   SpO2 97%   BMI 32.77 kg/m²     GEN: AAOx3, in NAD, in wheelchair accompanied by wife  HEAD: normocephalic, atraumatic  EYES: no injection or icterus  CVS: RRR  LUNGS: in no acute respiratory distress, CTAB  MSK:   Upper extremities:              Hands: b/l MCP joints w/o synovitis NTTP, there is chronic bony enlargement w/o synovitis of the b/l PIP and DIP joints NTTP, full fist formation w/ good  strength              Wrist: there is extensive bony hypertrophy w/ chronic synovial proliferation of the b/l wrist joints, L wrist joint slightly warm w/ mild synovitis NTTP, there is significant fusion of the wrist joints, wrist joints NTTP              Elbow: no synovitis or bursitis, FROM  Lower extremities:              Knees: s/p R TKR, L knee w/ trace effusion and bony enlaregment w/ crepitus              Ankles: no synovitis, FROM              Feet: no toe swelling or pain or warmth on palpation w/ FROM, negative MTP squeeze test  INTEGUMENT: no rash or psoriatic lesions, no petechiae, bruises, or palpable purpura, no patchy alopecia, no nail or periungual changes, no clubbing or digital ulcers    DATA:  Labs: I personally reviewed interval labs and discussed w/ the pt in detail which showed:    CBC (3/30/22): WBC, H/H and plt count wnl    Lab Results   Component Value Date    WBC 7.5 11/09/2021    HGB 15.3 11/09/2021    HCT 46.2 11/09/2021    MCV 98.0 11/09/2021     11/09/2021    LYMPHOPCT 17.1 11/09/2021    RBC 4.72 11/09/2021    MCH 32.3 11/09/2021    MCHC 33.0 11/09/2021    RDW 13.9 11/09/2021     Scr 0.93, GFR 89 (3/30/22)  Lab Results   Component Value Date    ALT <5 (L) 02/02/2022    AST 11 (L) 02/02/2022    ALKPHOS 79 02/02/2022    BILITOT 0.3 02/02/2022     Lab Results   Component Value Date    VITD25 58.2 02/02/2022     No results found for: C3     No results found for: C4     No results found for: ANTIDSDNAIGG     No results found for: [facility]     Lab Results   Component Value Date    CRP 0.8 08/20/2019    CRP 0.5 04/04/2019     Lab Results   Component Value Date    SEDRATE 8 08/20/2019    SEDRATE 7 04/04/2019     No results found for: CKTOTAL    Negative RF, CCP (4/4/19)  Negative HLA B27 (4/4/19)  Negative hepatitis B and C (4/4/19)  Negative SPEP and UPEP (4/4/19)  TSH, T4, thyroid peroxidase, anti-thyroglob Ab negative (3/11/19)  PTH wnl (3/11/19)  HgbA1c 6.1 (3/11/19)  Serum Mg wnl (6/13/19)  Ferritin and transferrin wnl (6/13/19)  Hemochromatosis genetic test (6/17/19): negative H63D, C282Y, S65C    Imaging:  I personally reviewed interval imaging and discussed w/ the pt in detail which included:    CT C-spine (3/12/19):  Postoperative findings of multilevel anterior and posterior fusion and laminectomy w/ significant loosening and extrusion of the posterior fusion hardware at C5, C6, and T1.   The interbody grafts at C4-5 and C5-6 are subtotally incorporated with minimal subtle questionable lucency around the C4 screws, no definite screw loosening at C5 or C6. Advanced multilevel cervical degenerative disc disease and facet arthrosis causing multilevel neural foraminal narrowing. Other level specific details are discussed above and on the prior MRI report. The cord compression and cord signal abnormality at C6-7 is much better demonstrated on MRI.      X-ray C-spine 8/6/19  IMPRESSION:  Stable postoperative changes from C4-C6 ACDF and C2-T2 posterior fusion. No hardware complications seen.      CT L-spine (3/12/19):  Postoperative findings of L4-S1 fusion again identified with similar appearance of bilateral L4 pedicle screw loosening, with subtle increased anterolisthesis at L4-5. Asymmetric widening of a left facet defect at L4-5 is similar to slightly more evident, with suspected pseudoarthrosis along the medial margin of a previously ankylosed right L4-5 facet joint. Unstable L4-5 fusion is again suspected. Definite loosening of the right L5 pedicle screw is not seen. The L5-S1 fusion appears solid. Multilevel degenerative disc disease and facet stenosis with other level specific details discussed above. The thecal sac is again obscured at the postoperative sites.      X-ray L-spine (12/17/19): IMPRESSION:  1.  Grade 1 spondylolisthesis at L4-5 and mild retrolisthesis at L3-4. No instability with flexion and extension.     X-rays (4/4/19):  B/l hands:   Severe degenerative changes about the wrists including the intercarpal spaces and radiocarpal spaces w/ SLAC wrist deformity bilaterally, chronic.  TFC complex calcifications are noted.  Crystal disease arthropathy is a primary consideration.      L knee:   Bicompartmental degenerative changes are noted.      SI joints:   No erosion or ankylosis appreciated.      Procedures:   EMG (10/17/18): Impression:   1.  Electrodiagnostic evidence is consistent with severe right carpal tunnel syndrome. There is median sensory and motor neuropathy and evidence of chronic denervation in the abductor pollicis brevis. 2. Electrodiagnostic evidence is consistent with moderate right ulnar sensory and motor neuropathy, with amplitude drop across the elbow segment. No evidence of acute or chronic denervation in the first dorsal interosseus and flexor carpi ulnaris. 3. Electrodiagnostic evidence is suggestive of chronic right C6 motor radiculopathy. 4. No electrodiagnostic evidence of right brachial plexopathy, peripheral neuropathy, or myopathy in nerves / muscles tested. Above results were discussed w/ the pt in detail during today's visit.

## 2022-05-11 ENCOUNTER — OFFICE VISIT (OUTPATIENT)
Dept: RHEUMATOLOGY | Age: 67
End: 2022-05-11
Payer: MEDICARE

## 2022-05-11 VITALS
HEART RATE: 87 BPM | DIASTOLIC BLOOD PRESSURE: 80 MMHG | HEIGHT: 66 IN | WEIGHT: 203 LBS | SYSTOLIC BLOOD PRESSURE: 124 MMHG | OXYGEN SATURATION: 97 % | BODY MASS INDEX: 32.62 KG/M2

## 2022-05-11 DIAGNOSIS — Z79.899 HIGH RISK MEDICATION USE: ICD-10-CM

## 2022-05-11 DIAGNOSIS — M17.0 PRIMARY OSTEOARTHRITIS OF BOTH KNEES: ICD-10-CM

## 2022-05-11 DIAGNOSIS — M11.89 CALCIUM PYROPHOSPHATE ARTHROPATHY OF MULTIPLE SITES: Primary | ICD-10-CM

## 2022-05-11 PROCEDURE — G8417 CALC BMI ABV UP PARAM F/U: HCPCS | Performed by: INTERNAL MEDICINE

## 2022-05-11 PROCEDURE — 4040F PNEUMOC VAC/ADMIN/RCVD: CPT | Performed by: INTERNAL MEDICINE

## 2022-05-11 PROCEDURE — G8427 DOCREV CUR MEDS BY ELIG CLIN: HCPCS | Performed by: INTERNAL MEDICINE

## 2022-05-11 PROCEDURE — 1036F TOBACCO NON-USER: CPT | Performed by: INTERNAL MEDICINE

## 2022-05-11 PROCEDURE — 99213 OFFICE O/P EST LOW 20 MIN: CPT | Performed by: INTERNAL MEDICINE

## 2022-05-11 PROCEDURE — 3017F COLORECTAL CA SCREEN DOC REV: CPT | Performed by: INTERNAL MEDICINE

## 2022-05-11 PROCEDURE — 1123F ACP DISCUSS/DSCN MKR DOCD: CPT | Performed by: INTERNAL MEDICINE

## 2022-05-11 RX ORDER — ALENDRONATE SODIUM 70 MG/1
70 TABLET ORAL
Qty: 4 TABLET | Refills: 3 | Status: CANCELLED | OUTPATIENT
Start: 2022-05-11

## 2022-05-11 RX ORDER — HYDROXYCHLOROQUINE SULFATE 200 MG/1
TABLET, FILM COATED ORAL
Qty: 180 TABLET | Refills: 1 | Status: SHIPPED | OUTPATIENT
Start: 2022-05-11

## 2022-05-11 NOTE — ASSESSMENT & PLAN NOTE
- pt reported well controlled joint Sx on HCQ. He had mild synovitis in his L wrist joint on exam today however he denied any pain. He is currently taking a Prednisone taper prescribed by Ortho for his knees. - cont same dose of HCQ.

## 2022-05-11 NOTE — ASSESSMENT & PLAN NOTE
- following w/ Ortho, s/p b/l TKR most recently L TKR in 4/2022.  - cont pain medications per Ortho and Pain Management.

## 2022-06-22 DIAGNOSIS — M80.00XA AGE-RELATED OSTEOPOROSIS WITH CURRENT PATHOLOGICAL FRACTURE, INITIAL ENCOUNTER: ICD-10-CM

## 2022-06-22 DIAGNOSIS — E29.1 HYPOGONADISM IN MALE: ICD-10-CM

## 2022-06-22 LAB
A/G RATIO: 1.5 (ref 1.1–2.2)
ALBUMIN SERPL-MCNC: 4.1 G/DL (ref 3.4–5)
ALP BLD-CCNC: 91 U/L (ref 40–129)
ALT SERPL-CCNC: <5 U/L (ref 10–40)
ANION GAP SERPL CALCULATED.3IONS-SCNC: 13 MMOL/L (ref 3–16)
AST SERPL-CCNC: 8 U/L (ref 15–37)
BILIRUB SERPL-MCNC: <0.2 MG/DL (ref 0–1)
BUN BLDV-MCNC: 11 MG/DL (ref 7–20)
CALCIUM SERPL-MCNC: 9 MG/DL (ref 8.3–10.6)
CHLORIDE BLD-SCNC: 106 MMOL/L (ref 99–110)
CO2: 24 MMOL/L (ref 21–32)
CREAT SERPL-MCNC: 0.9 MG/DL (ref 0.8–1.3)
GFR AFRICAN AMERICAN: >60
GFR NON-AFRICAN AMERICAN: >60
GLUCOSE BLD-MCNC: 96 MG/DL (ref 70–99)
POTASSIUM SERPL-SCNC: 4.1 MMOL/L (ref 3.5–5.1)
SODIUM BLD-SCNC: 143 MMOL/L (ref 136–145)
TOTAL PROTEIN: 6.9 G/DL (ref 6.4–8.2)

## 2022-06-24 LAB
SEX HORMONE BINDING GLOBULIN: 60 NMOL/L (ref 11–80)
TESTOSTERONE FREE-NONMALE: 99 PG/ML (ref 47–244)
TESTOSTERONE TOTAL: 674 NG/DL (ref 220–1000)

## 2022-06-27 ENCOUNTER — OFFICE VISIT (OUTPATIENT)
Dept: ENDOCRINOLOGY | Age: 67
End: 2022-06-27
Payer: MEDICARE

## 2022-06-27 VITALS
DIASTOLIC BLOOD PRESSURE: 75 MMHG | BODY MASS INDEX: 32.95 KG/M2 | RESPIRATION RATE: 16 BRPM | SYSTOLIC BLOOD PRESSURE: 112 MMHG | WEIGHT: 205 LBS | HEIGHT: 66 IN | HEART RATE: 87 BPM

## 2022-06-27 DIAGNOSIS — G20 PARKINSON DISEASE (HCC): ICD-10-CM

## 2022-06-27 DIAGNOSIS — E29.1 HYPOGONADISM IN MALE: ICD-10-CM

## 2022-06-27 DIAGNOSIS — M80.00XA AGE-RELATED OSTEOPOROSIS WITH CURRENT PATHOLOGICAL FRACTURE, INITIAL ENCOUNTER: Primary | ICD-10-CM

## 2022-06-27 PROCEDURE — 99213 OFFICE O/P EST LOW 20 MIN: CPT | Performed by: INTERNAL MEDICINE

## 2022-06-27 PROCEDURE — 1123F ACP DISCUSS/DSCN MKR DOCD: CPT | Performed by: INTERNAL MEDICINE

## 2022-06-27 PROCEDURE — G8427 DOCREV CUR MEDS BY ELIG CLIN: HCPCS | Performed by: INTERNAL MEDICINE

## 2022-06-27 PROCEDURE — 3017F COLORECTAL CA SCREEN DOC REV: CPT | Performed by: INTERNAL MEDICINE

## 2022-06-27 PROCEDURE — 1036F TOBACCO NON-USER: CPT | Performed by: INTERNAL MEDICINE

## 2022-06-27 PROCEDURE — G8417 CALC BMI ABV UP PARAM F/U: HCPCS | Performed by: INTERNAL MEDICINE

## 2022-06-27 RX ORDER — ALENDRONATE SODIUM 70 MG/1
70 TABLET ORAL
Qty: 4 TABLET | Refills: 5 | Status: SHIPPED | OUTPATIENT
Start: 2022-06-27

## 2022-06-27 NOTE — PROGRESS NOTES
Patient is seen here for management of Osteoporosis & hypogonadism. He has been diagnosed with Osteoporosis in 2013 by his orthopedic surgeon Dr. Brittany Carter he was started on Fosamax, he recalls it was switched to [facility] [place] monthly, then he needed dental implants so the bisphosphonates were stopped and were never resumed after wards. As he never followed up with the orthopedic doctor again. He also remembers that his dental implant was not successful . He has taken the following medications for his Osteoporosis:Fosamax     He has no history of fracture of long bones of upper or lower legs. He has  history of vertebral fractures T8 after a fall from ladder in 2009( fall 6-8 feet )   . He has approximately 3 inches height loss as compared to his  young age. He has underwent C-spine fusion surgery as well as lumbar spine fusion surgery. He  does  take vitamin D supplements. + MVI. He  has  1-2 daily servings of cheese, milk or yogurt daily. He exercises regularly. He  has no history of long term steroids, chemotherapy, multiple myeloma, prolonged immobilization, liver or kidney disease, malabsorption, organ transplant, or immunosuppressive medications. No history of diabetes. No history of any other agents with adverse effects on bony skeleton. He is not prone to falls and no history of recent falls. No history of rheumatoid arthritis or other autoimmune disorders. positive history of kidney stones in the past once . No history of proximal muscle weakness, thinning of skin, easy bruisability, centripetal accumulation of fat and excessive weight gain recently to suggest Cushing's syndrome. He does not smoke. No excessive alcohol use. There is no parental history of hip fracture. No family history of Osteoporosis. He had dental implants and stopped fosamax a year before that and never resumed it  and there is no upcoming dental surgeries planned.     He has severe OA s/p rt knee replacement He has spinal stenosis   He has neck surgery at C spine level Fusion done   He has lower lumbar spine surgery   Also diagnosed with  Parkinson and is having trouble walking   He had carpal tunnel surgery done in the past as well    Neurosurgery visits revealed  cervical myelopathy s/p C5-T1 posterior decompression fixation and fusion. . There was concern of hardware failure on cervical x-ray. He was told that he would require revision of these fusion surgeries in future but the quality of bone during these operations were was found to be week and hence was referred for osteoporosis    Family hx of Cathy Sosa ,son has Graves and one of his sisters have severe Graves Orbitopathy and required surgical decompression of her eyes patient's thyroid receptor antibodies were negative and thyroid fx test are within normal limits   He had back surgery in feb 2020 with some residual back pain ---still using a walker   No new complaints     --- patient underwent successful revision of C2-T2 posterior fusion on June 2019 per Dr. Leona Ramos  --Patient took Forteo from September 2019 to September 2021. Interim history    -- Stopped taking testosterone since February 2022 does not notice any change in his  Has been tolerating Fosamax without any side effects.         Past Medical History:   Diagnosis Date    Migraine     cluster     Osteoporosis     Parkinson disease ([facility] [address]      Past Surgical History:   Procedure Laterality Date    CARPAL TUNNEL RELEASE      x2 R, x1 L     COLONOSCOPY N/A 10/2/2020    COLONOSCOPY POLYPECTOMY SNARE/COLD BIOPSY performed by Delmer Gillespie MD at [facility] & at 25 (L) side     KNEE ARTHROSCOPY Left     KNEE SURGERY Right     LUMBAR FUSION      NECK SURGERY      x2    NECK SURGERY  06/21/2019    C2-T2 fusion     TONSILLECTOMY        Social History     Socioeconomic History    Marital status:      Spouse name: Not on file    Number of children: Not on file    Years of education: Not on file    Highest education level: Not on file   Occupational History    Not on file   Tobacco Use    Smoking status: Former Smoker    Smokeless tobacco: Never Used   Vaping Use    Vaping Use: Never used   Substance and Sexual Activity    Alcohol use: Never    Drug use: Never    Sexual activity: Not on file   Other Topics Concern    Not on file   Social History Narrative    Not on file     Social Determinants of Health     Financial Resource Strain:     Difficulty of Paying Living Expenses: Not on file   Food Insecurity:     Worried About [address] in the Last Year: Not on file    [address] in the Last Year: Not on file   Transportation Needs:     Lack of Transportation (Medical): Not on file    Lack of Transportation (Non-Medical): Not on file   Physical Activity:     Days of Exercise per Week: Not on file    Minutes of Exercise per Session: Not on file   Stress:     Feeling of Stress : Not on file   Social Connections:     Frequency of Communication with Friends and Family: Not on file    Frequency of Social Gatherings with Friends and Family: Not on file    Attends Zoroastrianism Services: Not on file    Active Member of [address] [place] [address] or Organizations: Not on file    Attends Club or Organization Meetings: Not on file    Marital Status: Not on file   Intimate Partner Violence:     Fear of Current or Ex-Partner: Not on file    Emotionally Abused: Not on file    Physically Abused: Not on file    Sexually Abused: Not on file   Housing Stability:     Unable to Pay for Housing in the Last Year: Not on file    Number of [place] in the Last Year: Not on file    Unstable Housing in the Last Year: Not on file     Family History   Problem Relation Age of Onset    Osteoporosis Mother     Cancer Father         skin     Prostate Cancer Brother 77     Prior to Admission medications    Medication Sig Start Date End Date Taking? Authorizing Provider   alendronate (FOSAMAX) 70 MG tablet Take 1 tablet by mouth every 7 days Take once per week in the morning with a full glass of water, on an empty stomach, and do not take anything else by mouth or lie down for the next 30 minutes. 6/27/22  Yes Zee Blanca MD   hydroxychloroquine (PLAQUENIL) 200 MG tablet TAKE 1 TABLET BY MOUTH TWO TIMES A DAY 5/11/22  Yes David Espinosa MD   gabapentin (NEURONTIN) 600 MG tablet TAKE 2 TABLETS BY MOUTH THREE TIMES A DAY 10/28/21  Yes Historical Provider, MD   cyclobenzaprine (FLEXERIL) 5 MG tablet TAKE 1 TABLET BY MOUTH THREE TIMES A DAY AS NEEDED FOR MUSCLE SPASM 8/5/20  Yes Historical Provider, MD   carbidopa-levodopa (SINEMET CR)  MG per extended release tablet Pt takes 1 tab at night 4/13/20  Yes Historical Provider, MD   carbidopa-levodopa (SINEMET)  MG per tablet Pt take 4 tabs four times a day.  2/28/19  Yes Historical Provider, MD   Cholecalciferol (VITAMIN D PO) Take 2,000 Units by mouth daily  12/31/18  Yes Historical Provider, MD     No Known Allergies   OBJECTIVE:  /75   Pulse 87   Resp 16   Ht 5' 6\" (1.676 m)   Wt 205 lb (93 kg)   BMI 33.09 kg/m²        Constitutional: no acute distress, well appearing and well nourished  Psychiatric: oriented to person, place and time, judgement and insight and normal, recent and remote memory intact and mood and affect are normal  Skin: skin and subcutaneous tissue is normal without visible mass,   Head and Face: visual inspection  of head and face revealed no abnormalities  Eyes: visual inspection showed no lid or conjunctival swelling, erythema or discharge, pupils are normal, equal, round  Ears/Nose: external inspection of ears and nose revealed no abnormalities, hearing is grossly normal  Oropharynx/Mouth/Face: lips, tongue and gums appear  normal with no lesions, the voice quality was normal  Neck: neck appears symmetric, with no visible masses,   Pulmonary: no increased work of breathing or signs of respiratory distress,  Musculoskeletal: normal on inspection    Neurological: normal coordination and normal general cortical function        Assessment/Plan:    --- Age-related osteoporosis  Started forteo sept 2019 ---sept 2021 as per Ortho recommendation   --stop Forteo and started Fosamax weekly in sept 2021 . Will continue with Fosamax for now. --- repeat DEXA in sept 2021 was normal BMD .  Results reviewed with the patient. --Initially therapy was started due to orthopedic recommendation as significant bone porosity was identified during surgery. ----DEXA scan was in March of 2019 done at [address] [facility] which shows lumbar spine T score of -1.3, left hip T score of 0.2 which is within normal limits, right femoral neck has a T score of -1.6 which is osteopenic. --- Patient has  prior history of thoracic spine compression fracture. --- his 24-hr urine calcium and creatinine was normal at 330 for on March 2019. Thyroid function test as well as parathyroid hormone levels were also within normal limits . His celiac disease panel was negative and bone turnover markers were not done although they were ordered. .    Patient was counseled on adequate calcium and Vitamin D intake and on fall precautions to minimize fracture risk. ---Advised to take a total of 1200 mg of elemental calcium (including diet and supplements). He  was provided with written information on sources of dietary calcium and general advice on maintaining optimal skeletal health. We also discussed the side effects and contraindications of Forteo therapy including increased risk of osteosarcoma in animal studies. Patient has no apparent contraindications to [facility] therapy     ---Hypogonadism with inappropriately normal FSH and LH  Stopped testosterone in dec 2021 and doesn't feels any difference in symptoms.   testo level was 674 in jume 2022 has been off testosterone for over 6 months   His PSA was within normal limits which was done on April third 2019  PSa 0.84 in December 2020    --Parkinson disease   He has been on medication for years   Stable follows with neurology. Also had Kamille's syndrome and cluster migraine headaches    --Severe  Arthritis  Follows withDr Loida Schroeder, he is now on Plaquenil. --. Spinal stenosis at L4-L5 level  Status post decompression fusion  ---underwent successful revision of C2-posterior fusion on June 2019 per Dr. Josselyn Sánchez  Spinal stimulator          Reviewed and/or ordered clinical lab results Yes  Reviewed and/or ordered radiology tests Yes  Reviewed and/or ordered other diagnostic tests Yes  Discussed test results with performing physician Yes  Made a decision to obtain old records Yes  Reviewed and summarized old records Yes      Missy Tsai was counseled regarding symptoms of current diagnosis, course and complications of disease if inadequately treated, side effects of medications, diagnosis, treatment options, and prognosis, risks, benefits, complications, and alternatives of treatment, labs, imaging and other studies and treatment targets and goals. He understands instructions and counseling. These diagnosis were discussed and reviewed with the patient including the advantages of drug therapy. He was counseled at this visit on the following: diabetes complication prevention and foot care. Return in about 1 year (around 6/27/2023). Please note that some or all of this report was generated using voice recognition software. Please notify me in case of any questions about the content of this document, as some errors in transcription may have occurred .

## 2022-07-18 NOTE — PROGRESS NOTES
ENDOSCOPY PREOP INSTRUCTIONS      You are scheduled for a procedure at [facility] on 7-19 @ 900. You will need to arrival by: 730 (at least an hour & a half prior to planned start time)  Report to the MAIN entrance on [address] and register at the information desk on the left-hand side of the lobby  You will need your insurance & photo ID with you. For your procedure:     PLEASE FOLLOW ALL INSTRUCTIONS & PREPS GIVEN TO YOU FROM YOUR DOCTOR'S OFFICE. If you have not received these instructions yet, please call the office immediately. Make sure to read them as soon as received. Bring an accurate list of any medications, including the dose/ frequency, with you on the day of the procedure. Make sure to include over the counter medications. If you are taking blood thinners, Aspirin or diabetic medication, make sure to call your doctor as soon as possible for instructions prior to your procedure. Please dress comfortably and do not wear any lotion, powders or jewelry  Arrange for someone to be with you and sign you out & drive you home after your procedure. We allow 2 visitors with you in the hospital & both of you are required to be masked. WOMEN ONLY OF CHILDBEARING AGE: Please make sure to be able to give a urine sample on arrival      If you have further questions, you may contact your Endoscopist's office or Pre Admission Testing staff [id number] 7/18/2022 .8:44 AM

## 2022-07-19 ENCOUNTER — ANESTHESIA (OUTPATIENT)
Dept: ENDOSCOPY | Age: 67
End: 2022-07-19
Payer: MEDICARE

## 2022-07-19 ENCOUNTER — HOSPITAL ENCOUNTER (OUTPATIENT)
Age: 67
Setting detail: OUTPATIENT SURGERY
Discharge: HOME | End: 2022-07-19
Attending: INTERNAL MEDICINE | Admitting: INTERNAL MEDICINE
Payer: MEDICARE

## 2022-07-19 ENCOUNTER — ANESTHESIA EVENT (OUTPATIENT)
Dept: ENDOSCOPY | Age: 67
End: 2022-07-19
Payer: MEDICARE

## 2022-07-19 VITALS
TEMPERATURE: 97.4 F | SYSTOLIC BLOOD PRESSURE: 129 MMHG | RESPIRATION RATE: 18 BRPM | OXYGEN SATURATION: 98 % | HEART RATE: 73 BPM | DIASTOLIC BLOOD PRESSURE: 89 MMHG | BODY MASS INDEX: 31.39 KG/M2 | HEIGHT: 67 IN | WEIGHT: 200 LBS

## 2022-07-19 DIAGNOSIS — Z86.010 HX OF COLONIC POLYPS: ICD-10-CM

## 2022-07-19 PROCEDURE — 3700000001 HC ADD 15 MINUTES (ANESTHESIA): Performed by: INTERNAL MEDICINE

## 2022-07-19 PROCEDURE — 3700000000 HC ANESTHESIA ATTENDED CARE: Performed by: INTERNAL MEDICINE

## 2022-07-19 PROCEDURE — 7100000010 HC PHASE II RECOVERY - FIRST 15 MIN: Performed by: INTERNAL MEDICINE

## 2022-07-19 PROCEDURE — 3609010600 HC COLONOSCOPY POLYPECTOMY SNARE/COLD BIOPSY: Performed by: INTERNAL MEDICINE

## 2022-07-19 PROCEDURE — 6360000002 HC RX W HCPCS

## 2022-07-19 PROCEDURE — 7100000011 HC PHASE II RECOVERY - ADDTL 15 MIN: Performed by: INTERNAL MEDICINE

## 2022-07-19 PROCEDURE — 2580000003 HC RX 258: Performed by: ANESTHESIOLOGY

## 2022-07-19 PROCEDURE — 2500000003 HC RX 250 WO HCPCS

## 2022-07-19 PROCEDURE — 2709999900 HC NON-CHARGEABLE SUPPLY: Performed by: INTERNAL MEDICINE

## 2022-07-19 PROCEDURE — 88305 TISSUE EXAM BY PATHOLOGIST: CPT

## 2022-07-19 RX ORDER — PROPOFOL 10 MG/ML
INJECTION, EMULSION INTRAVENOUS PRN
Status: DISCONTINUED | OUTPATIENT
Start: 2022-07-19 | End: 2022-07-19 | Stop reason: SDUPTHER

## 2022-07-19 RX ORDER — LIDOCAINE HYDROCHLORIDE 10 MG/ML
1 INJECTION, SOLUTION EPIDURAL; INFILTRATION; INTRACAUDAL; PERINEURAL
Status: DISCONTINUED | OUTPATIENT
Start: 2022-07-19 | End: 2022-07-19 | Stop reason: HOSPADM

## 2022-07-19 RX ORDER — SODIUM CHLORIDE, SODIUM LACTATE, POTASSIUM CHLORIDE, CALCIUM CHLORIDE 600; 310; 30; 20 MG/100ML; MG/100ML; MG/100ML; MG/100ML
INJECTION, SOLUTION INTRAVENOUS CONTINUOUS
Status: DISCONTINUED | OUTPATIENT
Start: 2022-07-19 | End: 2022-07-19 | Stop reason: HOSPADM

## 2022-07-19 RX ORDER — SODIUM CHLORIDE 9 MG/ML
INJECTION, SOLUTION INTRAVENOUS PRN
Status: DISCONTINUED | OUTPATIENT
Start: 2022-07-19 | End: 2022-07-19 | Stop reason: HOSPADM

## 2022-07-19 RX ORDER — PROPOFOL 10 MG/ML
INJECTION, EMULSION INTRAVENOUS CONTINUOUS PRN
Status: DISCONTINUED | OUTPATIENT
Start: 2022-07-19 | End: 2022-07-19 | Stop reason: SDUPTHER

## 2022-07-19 RX ORDER — LIDOCAINE HYDROCHLORIDE 20 MG/ML
INJECTION, SOLUTION EPIDURAL; INFILTRATION; INTRACAUDAL; PERINEURAL PRN
Status: DISCONTINUED | OUTPATIENT
Start: 2022-07-19 | End: 2022-07-19 | Stop reason: SDUPTHER

## 2022-07-19 RX ORDER — CELECOXIB 200 MG/1
CAPSULE ORAL
COMMUNITY
Start: 2022-07-10

## 2022-07-19 RX ORDER — SODIUM CHLORIDE 0.9 % (FLUSH) 0.9 %
5-40 SYRINGE (ML) INJECTION PRN
Status: DISCONTINUED | OUTPATIENT
Start: 2022-07-19 | End: 2022-07-19 | Stop reason: HOSPADM

## 2022-07-19 RX ORDER — SODIUM CHLORIDE 0.9 % (FLUSH) 0.9 %
5-40 SYRINGE (ML) INJECTION EVERY 12 HOURS SCHEDULED
Status: DISCONTINUED | OUTPATIENT
Start: 2022-07-19 | End: 2022-07-19 | Stop reason: HOSPADM

## 2022-07-19 RX ADMIN — PROPOFOL 10 MG: 10 INJECTION, EMULSION INTRAVENOUS at 09:36

## 2022-07-19 RX ADMIN — PROPOFOL 50 MG: 10 INJECTION, EMULSION INTRAVENOUS at 09:35

## 2022-07-19 RX ADMIN — PROPOFOL 150 MCG/KG/MIN: 10 INJECTION, EMULSION INTRAVENOUS at 09:35

## 2022-07-19 RX ADMIN — SODIUM CHLORIDE, POTASSIUM CHLORIDE, SODIUM LACTATE AND CALCIUM CHLORIDE: 600; 310; 30; 20 INJECTION, SOLUTION INTRAVENOUS at 08:39

## 2022-07-19 RX ADMIN — LIDOCAINE HYDROCHLORIDE 100 MG: 20 INJECTION, SOLUTION EPIDURAL; INFILTRATION; INTRACAUDAL; PERINEURAL at 09:35

## 2022-07-19 ASSESSMENT — PAIN - FUNCTIONAL ASSESSMENT
PAIN_FUNCTIONAL_ASSESSMENT: 0-10
PAIN_FUNCTIONAL_ASSESSMENT: 0-10

## 2022-07-19 ASSESSMENT — PAIN SCALES - WONG BAKER
WONGBAKER_NUMERICALRESPONSE: 0
WONGBAKER_NUMERICALRESPONSE: 0

## 2022-07-19 NOTE — DISCHARGE INSTRUCTIONS
ENDOSCOPY DISCHARGE INSTRUCTIONS:    Call the physician that did your procedure for any questions or concern:    [facility]: [phone]  DR. RADHA PENG      ACTIVITY:    There are potential side effects to the medications used for sedation and anesthesia during your procedure. These include:  Dizziness or light-headedness, confusion or memory loss, delayed reaction times, loss of coordination, nausea and vomiting. Because of your increased risk for injury, we ask that you observe the following precautions: For the next 24 hours,  DO NOT operate an automobile, bicycle, motorcycle, , power tools or large equipment of any kind. Do not drink alcohol, sign any legal documents or make any legal decisions for 24 hours. Do not bend your head over lower than your heart. DO sit on the side of bed/couch awhile before getting up. Plan on bedrest or quiet relaxation today. You may resume normal activities in 24 hours. DIET:    Your first meal today should be light, avoiding spicy and fatty foods. If you tolerate this first meal, then you may advance to your regular diet unless otherwise advised by your physician. NORMAL SYMPTOMS:  -Mild sore throat if youve had an EGD   -Gaseous discomfort    NOTIFY YOUR PHYSICIAN IF THESE SYMPTOMS OCCUR:  1. Fever (greater than 100)  5. Increased abdominal bloating  2. Severe pain    6. Excessive bleeding  3. Nausea and vomiting  7. Chest pain                                                                    4. Chills    8. Shortness of breath    ADDITIONAL INSTRUCTIONS:    Biopsy results: Call [address],[facility] for biopsy results in 1 week    Educational Information: Colon Polyps        Please review these discharge instructions this evening or tomorrow for  information you may have forgotten. We want to thank you for choosing the [facility] as your health care provider. We always strive to provide you with excellent care while you are here. You may receive a survey in the mail regarding your care. We would appreciate you taking a few minutes of your time to complete this survey.

## 2022-07-19 NOTE — ANESTHESIA POSTPROCEDURE EVALUATION
Department of Anesthesiology  Postprocedure Note    Patient: Chandana Chase  MRN: [id number]  YOB: 1955  Date of evaluation: 7/19/2022      Procedure Summary     Date: 07/19/22 Room / Location: [facility] / [facility]    Anesthesia Start: 6841 Anesthesia Stop: 1011    Procedure: COLONOSCOPY POLYPECTOMY SNARE/COLD BIOPSY Diagnosis:       Hx of colonic polyps      (Hx of colonic polyps [Z86.010])    Surgeons: Stephane Schwartz MD Responsible Provider: Reena Molina MD    Anesthesia Type: MAC ASA Status: 3          Anesthesia Type: No value filed.     Lorena Phase I: Lorena Score: 10    Lorena Phase II: Lorena Score: 10      Anesthesia Post Evaluation    Patient location during evaluation: bedside  Level of consciousness: awake and alert  Airway patency: patent  Nausea & Vomiting: no vomiting  Complications: no  Cardiovascular status: blood pressure returned to baseline  Respiratory status: acceptable  Hydration status: euvolemic  Multimodal analgesia pain management approach

## 2022-07-19 NOTE — H&P
Pre-operative History and Physical    Patient: Sharee Gonzales  : 1955     History Obtained From:  patient    HISTORY OF PRESENT ILLNESS:    The patient is a 75 y.o. male who presents for a colonoscopy for hx polyps  . Past Medical History:        Diagnosis Date    Migraine     cluster     Osteoporosis     Parkinson disease [facility])      Past Surgical History:        Procedure Laterality Date    BACK SURGERY  2019    CARPAL TUNNEL RELEASE      x2 R, x1 L     COLONOSCOPY N/A 10/02/2020    COLONOSCOPY POLYPECTOMY SNARE/COLD BIOPSY performed by Karie Harris MD at [address] [facility]      childhood & at 17 (L) side     KNEE ARTHROSCOPY Left     KNEE SURGERY Right     LUMBAR FUSION      NECK SURGERY      x2    NECK SURGERY  2019    C2-T2 fusion     TONSILLECTOMY       Medications Prior to Admission:   No current facility-administered medications on file prior to encounter. Current Outpatient Medications on File Prior to Encounter   Medication Sig Dispense Refill    celecoxib (CELEBREX) 200 MG capsule TAKE 1 CAPSULE BY MOUTH EVERY DAY WITH FOOD AS NEEDED      gabapentin (NEURONTIN) 600 MG tablet TAKE 2 TABLETS BY MOUTH THREE TIMES A DAY      cyclobenzaprine (FLEXERIL) 5 MG tablet TAKE 1 TABLET BY MOUTH THREE TIMES A DAY AS NEEDED FOR MUSCLE SPASM      carbidopa-levodopa (SINEMET CR)  MG per extended release tablet Pt takes 1 tab at night      carbidopa-levodopa (SINEMET)  MG per tablet Pt take 4 tabs four times a day. 5    Cholecalciferol (VITAMIN D PO) Take 2,000 Units by mouth daily        Allergies:  Patient has no known allergies.     History of allergic reaction to anesthesia:  No      PHYSICAL EXAM:      /86   Pulse 78   Temp 97.4 °F (36.3 °C) (Temporal)   Resp 16   Ht 5' 7\" (1.702 m)   Wt 200 lb (90.7 kg)   SpO2 99%   BMI 31.32 kg/m²  I        Heart:  No m/r/g +s1/s2 RRR    Lungs:  CTA bilaterally    Abdomen:  Soft, nontender, non distended; +bs    ASA Grade:  ASA 3 - Patient with moderate systemic disease with functional limitations    Mallampati Class: 2    ASSESSMENT AND PLAN:    1. Patient is a 75 y.o. male here for colonoscopy with conscious sedation  2. Procedure options, risks and benefits reviewed with patient. We specifically discussed that risks include, but are not limited to infection, bleeding, perforation, death, and missed lesions. Patient expresses understanding.

## 2022-07-19 NOTE — PROGRESS NOTES
Ambulatory Surgery/Procedure Discharge Note    Patient tolerated procedure well. Patient denies nausea, cramping or pain post procedure. Discharge instructions and education reviewed with patient and spouse. Written instructions provided at discharge. Patient discharged ambulatory in wheelchair to car. Spouse to drive pt home. Vitals:    07/19/22 1046   BP: 129/89   Pulse: 73   Resp: 18   Temp:    SpO2: 98%       In: 1500 [I.V.:1500]  Out: -     Restroom use offered before discharge. Yes    Pain assessment:  none  Pain Level: 0        Patient discharged to home/self care.  Patient discharged via wheel chair by transporter to waiting family/S.O.       7/19/2022 1110 AM

## 2022-07-19 NOTE — PROCEDURES
Colonoscopy REPORT    Patient:  Abigail Bourgeois                  1955    Endoscopist: SHLOMO Butts     Indication:  History of 14-15 adenomas/ SSP     Medications:  MAC    Pre-Anesthesia Assessment:  I have reviewed and am in agreement with patient history and medication, including previous response to sedation. Prior to the procedure, a History and Physical was performed, and patient medications and allergies were reviewed. The risks and benefits of the procedure and the sedation options and risks were discussed with the patient. Complications included but were not limited to infection, bleeding, perforation, death, and missed lesions. All questions were answered and informed consent was obtained by Dr Estefania Delgado. Patient identification and proposed procedure were verified by the physician and the nurse in the pre-procedure area and in the procedure room. Mallampatti: II  ASA Grade Assessment: 3    After reviewing the risks and benefits, the patient was deemed in satisfactory condition to undergo the procedure. The anesthesia plan was to use MAC sedation. Immediately prior to administration of medications, the patient was re-assessed for adequacy to receive sedatives and a time out was performed. Patient and healthcare providers were in agreement it was the correct patient and procedure. The heart rate, respiratory rate, oxygen saturations, blood pressure, adequacy of pulmonary ventilation, and response to care were monitored throughout the procedure. The physical status of the patient was re-assessed after the procedure. After adequate sedation was achieved in stepwise fashion a rectal examination was performed and showed no masses, moderate external hemorrhoidal tags noted    The pediatric colonoscope was then advanced atraumatically into the rectum and advanced without difficulty to the cecum.   The cecum was identified by the appendiceal orifice the ileocecal valve and other normal anatomy and a picture was obtained. The scope was then withdrawn (>6minutes) with close inspection of the mucosa in a circumferential manor. 7 mm cecal polyp, cold snared. 1 cm mid ascending polyp, cold snared. 2, 4-6 mm sigmoid polyps, cold snared. Retroflexed views of the rectum showed small internal hemorrhoids     No immediate complications. The preparation was good. Estimated blood loss trace    Impression:  7 mm cecal polyp, cold snared  1 cm mid ascending polyp, cold snared  2, 4-6 mm sigmoid polyps, cold snared  Small internal and moderate external hemorrhoids     Plan:  The patient is aware it is their responsibility to call for biopsy results in 7 days.   Repeat colonoscopy short term  Likely to need Genetics consult due to lifetime number of polyps  All children over 40 should be screened

## 2022-07-19 NOTE — ANESTHESIA PRE PROCEDURE
Department of Anesthesiology  Preprocedure Note       Name:  Jasmina Gonzales   Age:  75 y.o.  :  1955                                          MRN:  [id number]         Date:  2022      Surgeon: Tony Lorenzo):  rDu Benites MD    Procedure: Procedure(s):  COLONOSCOPY    Medications prior to admission:   Prior to Admission medications    Medication Sig Start Date End Date Taking? Authorizing Provider   alendronate (FOSAMAX) 70 MG tablet Take 1 tablet by mouth every 7 days Take once per week in the morning with a full glass of water, on an empty stomach, and do not take anything else by mouth or lie down for the next 30 minutes. 22   Vasiliy Joseph MD   hydroxychloroquine (PLAQUENIL) 200 MG tablet TAKE 1 TABLET BY MOUTH TWO TIMES A DAY 22   Shama Moore MD   gabapentin (NEURONTIN) 600 MG tablet TAKE 2 TABLETS BY MOUTH THREE TIMES A DAY 10/28/21   Historical Provider, MD   cyclobenzaprine (FLEXERIL) 5 MG tablet TAKE 1 TABLET BY MOUTH THREE TIMES A DAY AS NEEDED FOR MUSCLE SPASM 20   Historical Provider, MD   carbidopa-levodopa (SINEMET CR)  MG per extended release tablet Pt takes 1 tab at night 20   Historical Provider, MD   carbidopa-levodopa (SINEMET)  MG per tablet Pt take 4 tabs four times a day. 19   Historical Provider, MD   Cholecalciferol (VITAMIN D PO) Take 2,000 Units by mouth daily  18   Historical Provider, MD       Current medications:    No current outpatient medications on file. No current facility-administered medications for this visit.        Allergies:  No Known Allergies    Problem List:    Patient Active Problem List   Diagnosis Code    Age-related osteoporosis without current pathological fracture M81.0    Arthritis M19.90    Spinal stenosis at L4-L5 level M48.061    Parkinson disease ([facility] [address] G20    Elevated prostate specific antigen (PSA)  R97.20    Hypogonadism in male E29.1    Age-related osteoporosis with current pathological fracture M80.00XA    Calcium pyrophosphate arthropathy of multiple sites M11.89    High risk medication use Z79.899    Primary osteoarthritis of both knees M17.0       Past Medical History:        Diagnosis Date    Migraine     cluster     Osteoporosis     Parkinson disease ([address]        Past Surgical History:        Procedure Laterality Date    CARPAL TUNNEL RELEASE      x2 R, x1 L     COLONOSCOPY N/A 10/2/2020    COLONOSCOPY POLYPECTOMY SNARE/COLD BIOPSY performed by Jennifer Fel[facility]o MD [facility]      childhood & at 24 (L) side     KNEE ARTHROSCOPY Left     KNEE SURGERY Right     LUMBAR FUSION      NECK SURGERY      x2    NECK SURGERY  06/21/2019    C2-T2 fusion     TONSILLECTOMY         Social History:    Social History     Tobacco Use    Smoking status: Former    Smokeless tobacco: Never   Substance Use Topics    Alcohol use: Never                                Counseling given: Not Answered      Vital Signs (Current): There were no vitals filed for this visit.                                            BP Readings from Last 3 Encounters:   06/27/22 112/75   05/11/22 124/80   02/07/22 120/83       NPO Status:                                                                                 BMI:   Wt Readings from Last 3 Encounters:   06/27/22 205 lb (93 kg)   05/11/22 203 lb (92.1 kg)   02/07/22 209 lb (94.8 kg)     There is no height or weight on file to calculate BMI.    CBC:   Lab Results   Component Value Date/Time    WBC 7.5 11/09/2021 10:48 AM    RBC 4.72 11/09/2021 10:48 AM    HGB 15.3 11/09/2021 10:48 AM    HCT 46.2 11/09/2021 10:48 AM    MCV 98.0 11/09/2021 10:48 AM    RDW 13.9 11/09/2021 10:48 AM     11/09/2021 10:48 AM       CMP:   Lab Results   Component Value Date/Time     06/22/2022 09:47 AM    K 4.1 06/22/2022 09:47 AM     06/22/2022 09:47 AM    CO2 24 06/22/2022 09:47 AM    BUN 11 06/22/2022 09:47 AM    CREATININE 0.9 06/22/2022 09:47 AM    GFRAA >60 06/22/2022 09:47 AM    AGRATIO 1.5 06/22/2022 09:47 AM    LABGLOM >60 06/22/2022 09:47 AM    GLUCOSE 96 06/22/2022 09:47 AM    PROT 6.9 06/22/2022 09:47 AM    CALCIUM 9.0 06/22/2022 09:47 AM    BILITOT <0.2 06/22/2022 09:47 AM    ALKPHOS 91 06/22/2022 09:47 AM    AST 8 06/22/2022 09:47 AM    ALT <5 06/22/2022 09:47 AM       POC Tests: No results for input(s): POCGLU, POCNA, POCK, POCCL, POCBUN, POCHEMO, POCHCT in the last 72 hours. Coags: No results found for: PROTIME, INR, APTT    HCG (If Applicable): No results found for: PREGTESTUR, PREGSERUM, HCG, HCGQUANT     ABGs: No results found for: PHART, PO2ART, TXZ7HOG, YFK0NUG, BEART, G8KZLDQA     Type & Screen (If Applicable):  No results found for: LABABO, LABRH    Drug/Infectious Status (If Applicable):  No results found for: HIV, HEPCAB    COVID-19 Screening (If Applicable):   Lab Results   Component Value Date/Time    COVID19 NOT DETECTED 09/28/2020 02:57 PM         Anesthesia Evaluation   no history of anesthetic complications:   Airway: Mallampati: III  TM distance: >3 FB   Neck ROM: full  Mouth opening: < 3 FB   Dental:          Pulmonary: breath sounds clear to auscultation      (-) asthma and sleep apnea                           Cardiovascular:  Exercise tolerance: poor (<4 METS),   (+) hypertension:,     (-) past MI and  angina      Rhythm: regular  Rate: normal                    Neuro/Psych:   (+) neuromuscular disease: Parkinson's disease, headaches: cluster headaches, migraine headaches,              ROS comment: Spinal stenosis   GI/Hepatic/Renal:   (+) GERD:,           Endo/Other:    (+) : arthritis: OA., .    (-) diabetes mellitus               Abdominal:             Vascular: Other Findings:             Anesthesia Plan      MAC     ASA 3     (-npo MN  -denies chest pain or palp. Unable to walk at all due to left knee pain and severe back pain.   Uses walker if possible to only take 4-5 steps)  Induction: intravenous. Anesthetic plan and risks discussed with patient. Plan discussed with CRNA.                     Catherine Bender MD   7/19/2022

## 2022-11-04 NOTE — PROGRESS NOTES
Zurdo Torres MD  [facility]) Physicians - Rheumatology    [x] [facility]:  [facility]  [address] [facility] [] [facility]:  [address], [address]   Office: [phone]  Fax: [phone]     RHEUMATOLOGY PROGRESS NOTE    ASSESSMENT/PLAN:  Barry Jaime is a 75 y.o. 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 Kaveh Ward). PMHx pertinent for chronic neck and LBP w/ cervical myelopathy s/p C5-T1 posterior decompression fixation and fusion w/ concern for hardware failure s/p revision C2-T2 posterior fusion on 6/21/19, spinal stenosis and degenerative arthritis of the L-spine w/ unstable L4-5 fusion s/p lumbar fusion on 2/20/20, and Parkinson's disease. Current rheum related meds:   mg daily: started in 8/2019  Gabapentin and Cyclobenzaprine per [facility]  Fosamax: per Endocrinology     Prior rheum meds:  Colchicine 0.6 mg daily: took from 4/19 - 8/19, ineffective  Robaxin 750 mg BID  Fosamax, Boniva, d/c'ed d/t dental implants  S/p Forteo per Endocrinology    1. Calcium pyrophosphate arthropathy of multiple sites  Assessment & Plan:  - negative RF, CCP, HLA B27. Radiographic findings c/w CPPD arthropathy.  - did not respond to Colchicine. He has active synovitis on exam. He remains largely asymptomatic.  - instructed pt to d/w dermatologist his chronic elbow and ear rash - need to evaluate for PsO. Discussed trying MTX for better control of his inflammatory arthritis - provided pt handout. - cont  mg BID for now. - prescribed low dose Pred taper to take for arthritis flares. Orders:  -     hydroxychloroquine (PLAQUENIL) 200 MG tablet; Take 1 tablet by mouth 2 times daily, Disp-180 tablet, R-0Normal  -     Uric Acid; Future  -     C-Reactive Protein; Future  -     predniSONE (DELTASONE) 5 MG tablet;  Take 4 tablets by mouth daily for 7 days, THEN 3 tablets daily for 7 days, THEN 2 tablets daily for 7 days, THEN 1 tablet daily for 4 days. , Disp-67 tablet, R-0Normal  2. Primary osteoarthritis of both knees  Assessment & Plan:  - following w/ Ortho, s/p b/l TKR most recently L TKR in 4/2022.  - cont pain medications per Ortho and Pain Management. 3. Rash  Assessment & Plan:  - instructed pt to d/w dermatologist regarding possible PsO. Pt stated he was previously diagnosed w/ eczema. 4. High risk medication use  Assessment & Plan:  - he will be due for his annual eye exam in December. - d/w pt that MTX increases the risk of infections, birth defects, liver toxicity, rare lung problems, probable malignancy and bone marrow toxicity. Discussed need to check safety labs 4 weeks after starting MTX followed by labs every 3 months once we reach a steady dose. Strongly recommended alcohol abstinence. Orders:  -     ALT; Future  -     AST; Future  -     CBC with Auto Differential; Future  -     Creatinine; Future     Return in about 3 months (around 2/9/2023) for lab result discussion and treatment plan, medication monitoring. The risks and benefits of my recommendations, as well as other treatment options, benefits and side effects were discussed with the patient today. Questions were answered. NOTE: This report is transcribed by using voice recognition software dragon. Every effort is made to ensure accuracy; however, inadvertent computerized  transcription errors may be present. SUBJECTIVE:  Past medical/surgical history, medications and allergies are reviewed and updated as appropriate. Interval Hx:   Pt returns to the office today w/ his wife for Rheumatology f/u. He denies any significant arthralgias. Hand and wrist joints feel stiff for \"a few min\" in the morning. He reports compliance w/ HCQ. Pt states he has not taken Prednisone recently. Pt states he underwent L TKR in 4/2022, he is recovering well. He reports chronic mechanical neck and back pain.  He is seeing [facility] later today. He reports partial pain relief from Gabapentin and MIGUEL. Of note, pt reports a painful plaque on the back of his right elbow. He reports occasional patches inside his ears. He tells me that he sees an outside dermatologist MD and was reportedly told he has eczema. ROS:  Constitutional: denies fever/chills, night sweats, unintentional weight loss  Integumentary: denies photosensitivity, rash, patchy alopecia, or Sx of Raynaud's phenomenon  Cardiovascular: denies CP, palpitations, Hx of pericardial effusion or pericarditis  Respiratory: denies SOB, cough, hemoptysis, or pleurisy  Musculoskeletal:  refer to above HPI     No Known Allergies    Past Medical History:        Diagnosis Date    Migraine     cluster     Parkinson disease ([facility] [address]        Past Surgical History:        Procedure Laterality Date    BACK SURGERY  2019    CARPAL TUNNEL RELEASE      x2 R, x1 L     COLONOSCOPY N/A 10/02/2020    COLONOSCOPY POLYPECTOMY SNARE/COLD BIOPSY performed by Raymond Lennox, MD at [address] [facility] 7/19/2022    COLONOSCOPY POLYPECTOMY SNARE/COLD BIOPSY performed by Raymond Lennox, MD at [address]      childhood & at 17 (L) side     KNEE ARTHROSCOPY Left     KNEE SURGERY Right     LUMBAR FUSION      NECK SURGERY      x2    NECK SURGERY  06/21/2019    C2-T2 fusion     TONSILLECTOMY         Medications:    Current Outpatient Medications   Medication Sig Dispense Refill    hydroxychloroquine (PLAQUENIL) 200 MG tablet Take 1 tablet by mouth 2 times daily 180 tablet 0    predniSONE (DELTASONE) 5 MG tablet Take 4 tablets by mouth daily for 7 days, THEN 3 tablets daily for 7 days, THEN 2 tablets daily for 7 days, THEN 1 tablet daily for 4 days.  67 tablet 0    alendronate (FOSAMAX) 70 MG tablet Take 1 tablet by mouth every 7 days Take once per week in the morning with a full glass of water, on an empty stomach, and do not take anything else by mouth or lie down for the next 30 minutes. 4 tablet 5    gabapentin (NEURONTIN) 600 MG tablet TAKE 2 TABLETS BY MOUTH THREE TIMES A DAY      cyclobenzaprine (FLEXERIL) 5 MG tablet TAKE 1 TABLET BY MOUTH THREE TIMES A DAY AS NEEDED FOR MUSCLE SPASM      carbidopa-levodopa (SINEMET CR)  MG per extended release tablet Pt takes 1 tab at night      carbidopa-levodopa (SINEMET)  MG per tablet Pt take 4 tabs four times a day. 5    Cholecalciferol (VITAMIN D PO) Take 2,000 Units by mouth daily        No current facility-administered medications for this visit. OBJECTIVE:  Physical Exam:  /78   Pulse 90   Wt 200 lb (90.7 kg)   BMI 31.32 kg/m²     GEN: AAOx3, in NAD, in wheelchair accompanied by wife  HEAD: normocephalic, atraumatic  EYES: no injection or icterus  CVS: RRR  LUNGS: in no acute respiratory distress, CTAB  MSK:   Upper extremities:              Hands: there is ulnar drift of the fingers, R>L MCP joints w/ synovial thickening R MCP joints w/ synovitis NTTP, b/l PIP joints w/ bony enlargement no synovitis NTTP, b/l DIP joints w/o synovitis NTTP, full fist formation w/ good  strength              Wrist: there is extensive bony hypertrophy w/ chronic synovial proliferation of the b/l wrist joints, R wrist joint w/ mild synovitis NTTP, there is significant fusion of the wrist joints, wrist joints NTTP              Elbow: no synovitis or bursitis, FROM  Lower extremities:              Knees: s/p R TKR, L knee w/ trace effusion and bony enlaregment w/ crepitus              Ankles: no synovitis, FROM              Feet: no toe swelling or pain or warmth on palpation w/ FROM, negative MTP squeeze test  INTEGUMENT: scaly plaque on the extensor surface of the R elbow, no nail pitting or onycholysis, no periungual erythema, no petechiae, bruises, or palpable purpura, no patchy alopecia, no nail or periungual changes, no clubbing or digital ulcers    DATA:  Labs:   I personally reviewed interval labs and discussed w/ the pt in detail which showed:    CBC (7/14/22): WBC, H/H and plt count wnl  Lab Results   Component Value Date    WBC 7.5 11/09/2021    HGB 15.3 11/09/2021    HCT 46.2 11/09/2021    MCV 98.0 11/09/2021     11/09/2021    LYMPHOPCT 17.1 11/09/2021    RBC 4.72 11/09/2021    MCH 32.3 11/09/2021    MCHC 33.0 11/09/2021    RDW 13.9 11/09/2021     Scr 1.02, eGFR 80, LFTs wnl (7/14/22)  Lab Results   Component Value Date    ALT <5 (L) 06/22/2022    AST 8 (L) 06/22/2022    ALKPHOS 91 06/22/2022    BILITOT <0.2 06/22/2022     No results found for: MG  Serum Mg wnl (6/13/19)    Lab Results   Component Value Date    VITD25 58.2 02/02/2022     Lab Results   Component Value Date    PTH 25.6 03/11/2019    CALCIUM 9.0 06/22/2022    PHOS 3.7 03/11/2019     Lab Results   Component Value Date    TSH 2.94 02/02/2022    FXI7EUJ 1.40 08/05/2020     No results found for: C3     No results found for: C4     No results found for: ANTIDSDNAIGG     No results found for: [facility]     Lab Results   Component Value Date    CRP 0.8 08/20/2019    CRP 0.5 04/04/2019     Lab Results   Component Value Date    SEDRATE 8 08/20/2019    SEDRATE 7 04/04/2019     No results found for: CKTOTAL    Negative RF, CCP (4/4/19)  Negative HLA B27 (4/4/19)  Ferritin and transferrin wnl (6/13/19)  Hemochromatosis genetic test (6/17/19): negative H63D, C282Y, S65C  Negative SPEP and UPEP (4/4/19)    Imaging:  I personally reviewed interval imaging and discussed w/ the pt in detail which included:    CT C-spine (3/12/19):  Postoperative findings of multilevel anterior and posterior fusion and laminectomy w/ significant loosening and extrusion of the posterior fusion hardware at C5, C6, and T1. The interbody grafts at C4-5 and C5-6 are subtotally incorporated with minimal subtle questionable lucency around the C4 screws, no definite screw loosening at C5 or C6.   Advanced multilevel cervical degenerative disc disease and facet arthrosis causing multilevel neural foraminal narrowing. Other level specific details are discussed above and on the prior MRI report. The cord compression and cord signal abnormality at C6-7 is much better demonstrated on MRI. X-ray C-spine 8/6/19  IMPRESSION:  Stable postoperative changes from C4-C6 ACDF and C2-T2 posterior fusion. No hardware complications seen. CT L-spine (3/12/19):  Postoperative findings of L4-S1 fusion again identified with similar appearance of bilateral L4 pedicle screw loosening, with subtle increased anterolisthesis at L4-5. Asymmetric widening of a left facet defect at L4-5 is similar to slightly more evident, with suspected pseudoarthrosis along the medial margin of a previously ankylosed right L4-5 facet joint. Unstable L4-5 fusion is again suspected. Definite loosening of the right L5 pedicle screw is not seen. The L5-S1 fusion appears solid. Multilevel degenerative disc disease and facet stenosis with other level specific details discussed above. The thecal sac is again obscured at the postoperative sites. X-ray L-spine (12/17/19): IMPRESSION:  1.  Grade 1 spondylolisthesis at L4-5 and mild retrolisthesis at L3-4. No instability with flexion and extension. X-rays (4/4/19):  B/l hands:   Severe degenerative changes about the wrists including the intercarpal spaces and radiocarpal spaces w/ SLAC wrist deformity bilaterally, chronic. TFC complex calcifications are noted. Crystal disease arthropathy is a primary consideration. L knee:   Bicompartmental degenerative changes are noted. SI joints:   No erosion or ankylosis appreciated. Procedures:   EMG (10/17/18): Impression:   1. Electrodiagnostic evidence is consistent with severe right carpal tunnel syndrome. There is median sensory and motor neuropathy and evidence of chronic denervation in the abductor pollicis brevis.    2. Electrodiagnostic evidence is consistent with moderate right ulnar sensory and motor neuropathy, with amplitude drop across the elbow segment. No evidence of acute or chronic denervation in the first dorsal interosseus and flexor carpi ulnaris. 3. Electrodiagnostic evidence is suggestive of chronic right C6 motor radiculopathy. 4. No electrodiagnostic evidence of right brachial plexopathy, peripheral neuropathy, or myopathy in nerves / muscles tested. Above results were discussed w/ the pt in detail during today's visit.

## 2022-11-09 ENCOUNTER — OFFICE VISIT (OUTPATIENT)
Dept: RHEUMATOLOGY | Age: 67
End: 2022-11-09
Payer: MEDICARE

## 2022-11-09 VITALS
BODY MASS INDEX: 31.32 KG/M2 | DIASTOLIC BLOOD PRESSURE: 78 MMHG | HEART RATE: 90 BPM | WEIGHT: 200 LBS | SYSTOLIC BLOOD PRESSURE: 110 MMHG

## 2022-11-09 DIAGNOSIS — M11.89 CALCIUM PYROPHOSPHATE ARTHROPATHY OF MULTIPLE SITES: Primary | ICD-10-CM

## 2022-11-09 DIAGNOSIS — M17.0 PRIMARY OSTEOARTHRITIS OF BOTH KNEES: ICD-10-CM

## 2022-11-09 DIAGNOSIS — R21 RASH: ICD-10-CM

## 2022-11-09 DIAGNOSIS — Z79.899 HIGH RISK MEDICATION USE: ICD-10-CM

## 2022-11-09 PROCEDURE — 99214 OFFICE O/P EST MOD 30 MIN: CPT | Performed by: INTERNAL MEDICINE

## 2022-11-09 PROCEDURE — 1036F TOBACCO NON-USER: CPT | Performed by: INTERNAL MEDICINE

## 2022-11-09 PROCEDURE — 3017F COLORECTAL CA SCREEN DOC REV: CPT | Performed by: INTERNAL MEDICINE

## 2022-11-09 PROCEDURE — G8427 DOCREV CUR MEDS BY ELIG CLIN: HCPCS | Performed by: INTERNAL MEDICINE

## 2022-11-09 PROCEDURE — G8417 CALC BMI ABV UP PARAM F/U: HCPCS | Performed by: INTERNAL MEDICINE

## 2022-11-09 PROCEDURE — G8484 FLU IMMUNIZE NO ADMIN: HCPCS | Performed by: INTERNAL MEDICINE

## 2022-11-09 PROCEDURE — 1123F ACP DISCUSS/DSCN MKR DOCD: CPT | Performed by: INTERNAL MEDICINE

## 2022-11-09 RX ORDER — HYDROXYCHLOROQUINE SULFATE 200 MG/1
TABLET, FILM COATED ORAL
Qty: 180 TABLET | Refills: 1 | Status: CANCELLED | OUTPATIENT
Start: 2022-11-09

## 2022-11-09 RX ORDER — PREDNISONE 1 MG/1
TABLET ORAL
Qty: 67 TABLET | Refills: 0 | Status: SHIPPED | OUTPATIENT
Start: 2022-11-09 | End: 2022-12-04

## 2022-11-09 RX ORDER — HYDROXYCHLOROQUINE SULFATE 200 MG/1
200 TABLET, FILM COATED ORAL 2 TIMES DAILY
Qty: 180 TABLET | Refills: 0 | Status: SHIPPED | OUTPATIENT
Start: 2022-11-09 | End: 2023-02-07

## 2022-11-09 NOTE — PATIENT INSTRUCTIONS
METHOTREXATE DOSING    1. Start taking 4 tablets of Methotrexate once a week for the first 2 weeks. If well-tolerated, then start taking 5 tablets all at once once a week. 2. Check laboratory studies in 4 weeks after starting the Methotrexate  3. Take Folic Acid 1 mg daily     Hold Methotrexate if you are on antibiotics. Restart once and have recovered from the infection. No alcohol while on Methotrexate! PLEASE CALL WITH ANY QUESTIONS OR CONCERNS [phone]    Methotrexate (oral)     Pronunciation: meth oh TREX ate   Brand: Rheumatrex Dose Pack, Trexall   What is methotrexate? Methotrexate interferes with the growth of certain cells of the body, especially cells that reproduce quickly, such as cancer cells, bone marrow cells, and skin cells. Methotrexate is used to treat certain types of cancer of the breast, skin, head and neck, or lung. Methotrexate is also used to treat severe psoriasis and rheumatoid arthritis. Methotrexate is usually given after other medications have been tried without successful treatment of symptoms. Methotrexate may also be used for purposes not listed in this medication guide. What should I discuss with my healthcare provider before taking methotrexate? You should not use this medicine if you are allergic to methotrexate. Do not use methotrexate to treat psoriasis or rheumatoid arthritis if you have:   alcoholism, cirrhosis, or other liver disease;   a blood cell disorder such as anemia (lack of red blood cells) or leukopenia (lack of white blood cells);   a bone marrow disorder; or   if you are breast-feeding a baby. Methotrexate is sometimes used to treat cancer even when patients do have one of the conditions listed above. Your doctor will decide if this treatment is right for you.    To make sure methotrexate is safe for you, tell your doctor if you have:   kidney disease;   a folate deficiency;   pneumonia or lung disease;   stomach ulcers;   any type of infection; or   if you are receiving radiation treatments. Methotrexate can cause birth defects in an unborn baby. Do not use methotrexate to treat psoriasis or rheumatoid arthritis if you are pregnant. Tell your doctor right away if you become pregnant during treatment. You may need to have a negative pregnancy test before starting this treatment. Use birth control to prevent pregnancy while you are using methotrexate, whether you are a man or a woman. Methotrexate use by either parent may cause birth defects. If you are a man, use a condom to keep from causing a pregnancy while you are using methotrexate. Continue using condoms for at least 90 days after your treatment ends. If you are a woman, use an effective form of birth control while you are taking methotrexate, and for at least one cycle of ovulation after your treatment ends. Do not give this medicine to a child without the advice of a doctor. How should I take methotrexate? Follow all directions on your prescription label. Do not take this medicine in larger or smaller amounts or for longer than recommended. You must use the correct dose of methotrexate for your condition. Methotrexate is sometimes taken once or twice per week and not every day. Follow the directions on your prescription label. Some people have  after taking methotrexate every day by accident. Ask your doctor or pharmacist if you have questions about your dose of methotrexate or how often to take it. Use methotrexate regularly to get the most benefit. Get your prescription refilled before you run out of medicine completely. Methotrexate can lower blood cells that help your body fight infections and help your blood to clot. Your blood will need to be tested often, and you may need an occasional liver biopsy. Your cancer treatments may be delayed based on the results of these tests. Store at room temperature away from moisture and heat.    What happens if I miss a dose?   Call your doctor for instructions if you miss a dose of methotrexate. What happens if I overdose? Seek emergency medical attention or call the Poison Help line at 1-720.688.1687. An overdose of methotrexate can be fatal.   What should I avoid while taking methotrexate? This medicine can pass into body fluids (including urine, feces, vomit, semen, vaginal fluid). Patients and caregivers should wear rubber gloves while cleaning up body fluids, handling contaminated trash or laundry or changing diapers. Wash hands before and after removing gloves. Wash soiled clothing and linens separately from other laundry. Body fluids should not be handled by a woman who is pregnant or who may become pregnant. Use condoms during sexual activity to avoid exposure to body fluids. Avoid exposure to sunlight or artificial UV rays (sunlamps or tanning beds), especially if you are being treated for psoriasis. Methotrexate can make your skin more sensitive to sunlight and your psoriasis may worsen. Avoid drinking alcohol while taking methotrexate. What are the possible side effects of methotrexate? Get emergency medical help if you have signs of an allergic reaction: hives; difficulty breathing; swelling of your face, lips, tongue, or throat.    Stop using methotrexate and call your doctor at once if you have:   dry cough, shortness of breath;   diarrhea, vomiting, white patches or sores inside your mouth or on your lips;   blood in your urine or stools;   swelling, rapid weight gain, little or no urinating;   seizure (convulsions);   fever, chills, body aches, flu symptoms;   pale skin, easy bruising, unusual bleeding, weakness, feeling light-headed or short of breath;   liver problems --nausea, upper stomach pain, itching, tired feeling, loss of appetite, dark urine, hao-colored stools, jaundice (yellowing of the skin or eyes); or   severe skin reaction --fever, sore throat, swelling in your face or tongue, burning in your eyes, skin pain, followed by a red or purple skin rash that spreads (especially in the face or upper body) and causes blistering and peeling. Older adults may be more likely to have side effects from this medicine. Common side effects may include:   vomiting, upset stomach;   headache, dizziness, tired feeling; or   blurred vision. This is not a complete list of side effects and others may occur. Call your doctor for medical advice about side effects. You may report side effects to FDA at 1-554-FDA-2858. What other drugs will affect methotrexate? Many drugs can interact with methotrexate. Not all possible interactions are listed here. Tell your doctor about all your medications and any you start or stop using during treatment with methotrexate, especially:   azathioprine;   leucovorin;   phenytoin;   probenecid;   theophylline;   an antibiotic or sulfa drugs;   isotretinoin, retinol, tretinoin;   NSAIDs (non-steroidal anti-inflammatory drugs) --ibuprofen (Advil, Motrin), naproxen (Aleve), celecoxib, diclofenac, indomethacin, meloxicam, and others; or   salicylates --aspirin, Nuprin Backache Caplet, Kaopectate, KneeRelief, Pamprin Cramp Formula, Pepto-Bismol, Tricosal, Trilisate, and others. This list is not complete and many other drugs can interact with methotrexate. This includes prescription and over-the-counter medicines, vitamins, and herbal products. Give a list of all your medicines to any healthcare provider who treats you. Where can I get more information? Your pharmacist can provide more information about methotrexate. Remember, keep this and all other medicines out of the reach of children, never share your medicines with others, and use this medication only for the indication prescribed. Every effort has been made to ensure that the information provided by [address] is accurate, up-to-date, and complete, but no guarantee is made to that effect.  Drug information contained herein may be time sensitive. [employer] information has been compiled for use by healthcare practitioners and consumers in the [place] and therefore [employer] does not warrant that uses outside of the [place] are appropriate, unless specifically indicated otherwise. [facility] [employer] drug information does not endorse drugs, diagnose patients or recommend therapy. [employer] drug information is an informational resource designed to assist licensed healthcare practitioners in caring for their patients and/or to serve consumers viewing this service as a supplement to, and not a substitute for, the expertise, skill, knowledge and judgment of healthcare practitioners. The absence of a warning for a given drug or drug combination in no way should be construed to indicate that the drug or drug combination is safe, effective or appropriate for any given patient. [facility] does not assume any responsibility for any aspect of healthcare administered with the aid of information [facility] provides. The information contained herein is not intended to cover all possible uses, directions, precautions, warnings, drug interactions, allergic reactions, or adverse effects. If you have questions about the drugs you are taking, check with your doctor, nurse or pharmacist.   Copyright 0706-9124 [address]. Version: 11.03. Revision date: 1/6/2015. This information does not replace the advice of a doctor. [email or web address] disclaims any warranty or liability for your use of this information.    Content Version: 51.4.556153

## 2022-11-09 NOTE — ASSESSMENT & PLAN NOTE
- instructed pt to d/w dermatologist regarding possible PsO. Pt stated he was previously diagnosed w/ eczema.

## 2022-11-09 NOTE — ASSESSMENT & PLAN NOTE
- negative RF, CCP, HLA B27. Radiographic findings c/w CPPD arthropathy.  - did not respond to Colchicine. He has active synovitis on exam. He remains largely asymptomatic.  - instructed pt to d/w dermatologist his chronic elbow and ear rash - need to evaluate for PsO. Discussed trying MTX for better control of his inflammatory arthritis - provided pt handout. - cont  mg BID for now. - prescribed low dose Pred taper to take for arthritis flares.

## 2022-11-09 NOTE — ASSESSMENT & PLAN NOTE
- he will be due for his annual eye exam in December. - d/w pt that MTX increases the risk of infections, birth defects, liver toxicity, rare lung problems, probable malignancy and bone marrow toxicity. Discussed need to check safety labs 4 weeks after starting MTX followed by labs every 3 months once we reach a steady dose. Strongly recommended alcohol abstinence.

## 2023-03-01 ENCOUNTER — OFFICE VISIT (OUTPATIENT)
Dept: RHEUMATOLOGY | Age: 68
End: 2023-03-01
Payer: MEDICARE

## 2023-03-01 VITALS
WEIGHT: 200 LBS | DIASTOLIC BLOOD PRESSURE: 78 MMHG | BODY MASS INDEX: 31.32 KG/M2 | HEART RATE: 80 BPM | SYSTOLIC BLOOD PRESSURE: 118 MMHG

## 2023-03-01 DIAGNOSIS — R21 RASH: ICD-10-CM

## 2023-03-01 DIAGNOSIS — Z79.899 LONG-TERM USE OF HYDROXYCHLOROQUINE: Chronic | ICD-10-CM

## 2023-03-01 DIAGNOSIS — M11.89 CALCIUM PYROPHOSPHATE ARTHROPATHY OF MULTIPLE SITES: Primary | ICD-10-CM

## 2023-03-01 PROCEDURE — G8484 FLU IMMUNIZE NO ADMIN: HCPCS | Performed by: INTERNAL MEDICINE

## 2023-03-01 PROCEDURE — G8427 DOCREV CUR MEDS BY ELIG CLIN: HCPCS | Performed by: INTERNAL MEDICINE

## 2023-03-01 PROCEDURE — G8417 CALC BMI ABV UP PARAM F/U: HCPCS | Performed by: INTERNAL MEDICINE

## 2023-03-01 PROCEDURE — 3017F COLORECTAL CA SCREEN DOC REV: CPT | Performed by: INTERNAL MEDICINE

## 2023-03-01 PROCEDURE — 99214 OFFICE O/P EST MOD 30 MIN: CPT | Performed by: INTERNAL MEDICINE

## 2023-03-01 PROCEDURE — 1123F ACP DISCUSS/DSCN MKR DOCD: CPT | Performed by: INTERNAL MEDICINE

## 2023-03-01 PROCEDURE — 1036F TOBACCO NON-USER: CPT | Performed by: INTERNAL MEDICINE

## 2023-03-01 RX ORDER — HYDROXYCHLOROQUINE SULFATE 200 MG/1
200 TABLET, FILM COATED ORAL 2 TIMES DAILY
Qty: 180 TABLET | Refills: 0 | Status: SHIPPED | OUTPATIENT
Start: 2023-03-01 | End: 2023-03-01

## 2023-03-01 RX ORDER — HYDROXYCHLOROQUINE SULFATE 200 MG/1
200 TABLET, FILM COATED ORAL 2 TIMES DAILY
Qty: 180 TABLET | Refills: 1 | Status: SHIPPED | OUTPATIENT
Start: 2023-03-01 | End: 2023-05-30

## 2023-03-01 NOTE — ASSESSMENT & PLAN NOTE
- negative RF, CCP, HLA B27. Radiographic findings c/w CPPD arthropathy.  - did not respond to Colchicine. He cont to have mild synovitis in his R wrist joint on HCQ, this has improved since last visit. Provided pt handout on MTX - we discussed adding this at his next f/u visit or if he develops pain in his hand/wrist joints. Of note, pt states he was evaluated by his dermatologist for his rash on his elbow and was told he does not have PsO. - cont  mg BID.

## 2023-03-01 NOTE — PROGRESS NOTES
Richelle Chamorro MD  [facility]) Physicians - Rheumatology    [x] [facility]:  [facility] [address] [facility] [] [facility]:  [address], [address]   Office: [phone]  Fax: [phone]     RHEUMATOLOGY PROGRESS NOTE    ASSESSMENT/PLAN:  Elise Franks is a 75 y.o. 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 Rachael Guadarrama). PMHx pertinent for chronic neck and LBP w/ cervical myelopathy s/p C5-T1 posterior decompression fixation and fusion w/ concern for hardware failure s/p revision C2-T2 posterior fusion on 6/21/19, spinal stenosis and degenerative arthritis of the L-spine w/ unstable L4-5 fusion s/p lumbar fusion on 2/20/20, and Parkinson's disease. Current rheum related meds:   mg daily: started in 8/2019  Gabapentin and Cyclobenzaprine per [facility]  Fosamax: per Endocrinology     Prior rheum meds:  Colchicine 0.6 mg daily: took from 4/19 - 8/19, ineffective  Robaxin 750 mg BID  Fosamax, Boniva, d/c'ed d/t dental implants  S/p Forteo per Endocrinology    1. Calcium pyrophosphate arthropathy of multiple sites  Assessment & Plan:  - negative RF, CCP, HLA B27. Radiographic findings c/w CPPD arthropathy.  - did not respond to Colchicine. He cont to have mild synovitis in his R wrist joint on HCQ, this has improved since last visit. Provided pt handout on MTX - we discussed adding this at his next f/u visit or if he develops pain in his hand/wrist joints. Of note, pt states he was evaluated by his dermatologist for his rash on his elbow and was told he does not have PsO. - cont  mg BID. Orders:  -     hydroxychloroquine (PLAQUENIL) 200 MG tablet; Take 1 tablet by mouth 2 times daily, Disp-180 tablet, R-1Normal  2. Rash  Assessment & Plan:  - per pt he was diagnosed w/ eczema by his dermatologist and prescribed topicals. This remains active.   3. Long-term use of hydroxychloroquine  Assessment & Plan:  - annual eye exam for HCQ toxicity monitoring.  - d/w pt that MTX increases the risk of infections, birth defects, liver toxicity, rare lung problems, probable malignancy and bone marrow toxicity. Discussed need to check safety labs 4 weeks after starting MTX followed by labs every 3 months once we reach a steady dose. Strongly recommended alcohol abstinence. Return in about 10 weeks (around 5/10/2023) for lab result discussion and treatment plan, medication monitoring. The risks and benefits of my recommendations, as well as other treatment options, benefits and side effects were discussed with the patient today. Questions were answered. NOTE: This report is transcribed by using voice recognition software dragon. Every effort is made to ensure accuracy; however, inadvertent computerized  transcription errors may be present. SUBJECTIVE:  Past medical/surgical history, medications and allergies are reviewed and updated as appropriate. Interval Hx:   Pt returns to the office today w/ his wife for Rheumatology f/u. Pt cont to deny any arthralgias or joint stiffness in his hand or wrist joints. He reports limited ROM in his wrists but he denies any discomfort. He reports compliance w/ HCQ. He cont to have a scaly plaque on the extensor surface of his R forearm - pt states he did see his dermatologist who prescribed him a topical for eczema.     ROS:  Constitutional: denies fever/chills, night sweats, unintentional weight loss  Integumentary: denies photosensitivity, rash, patchy alopecia, or Sx of Raynaud's phenomenon  Cardiovascular: denies CP, palpitations, Hx of pericardial effusion or pericarditis  Respiratory: denies SOB, cough, hemoptysis, or pleurisy  Musculoskeletal:  refer to above HPI     No Known Allergies    Past Medical History:        Diagnosis Date    Migraine     cluster     Parkinson disease ([facility] [address]        Past Surgical History:        Procedure Laterality Date    BACK SURGERY  2019    CARPAL TUNNEL RELEASE      x2 R, x1 L     COLONOSCOPY N/A 10/02/2020    COLONOSCOPY POLYPECTOMY SNARE/COLD BIOPSY performed by [facility] at [facility] N/A 7/19/2022    COLONOSCOPY POLYPECTOMY SNARE/COLD BIOPSY performed by Shannon Wilson MD at [address] [facility]      childhood & at 17 (L) side     KNEE ARTHROSCOPY Left     KNEE SURGERY Right     LUMBAR FUSION      NECK SURGERY      x2    NECK SURGERY  06/21/2019    C2-T2 fusion     TONSILLECTOMY         Medications:    Current Outpatient Medications   Medication Sig Dispense Refill    hydroxychloroquine (PLAQUENIL) 200 MG tablet Take 1 tablet by mouth 2 times daily 180 tablet 1    alendronate (FOSAMAX) 70 MG tablet Take 1 tablet by mouth every 7 days Take once per week in the morning with a full glass of water, on an empty stomach, and do not take anything else by mouth or lie down for the next 30 minutes. 4 tablet 5    gabapentin (NEURONTIN) 600 MG tablet TAKE 2 TABLETS BY MOUTH THREE TIMES A DAY      cyclobenzaprine (FLEXERIL) 5 MG tablet TAKE 1 TABLET BY MOUTH THREE TIMES A DAY AS NEEDED FOR MUSCLE SPASM      carbidopa-levodopa (SINEMET CR)  MG per extended release tablet Pt takes 1 tab at night      carbidopa-levodopa (SINEMET)  MG per tablet Pt take 4 tabs four times a day. 5    Cholecalciferol (VITAMIN D PO) Take 2,000 Units by mouth daily        No current facility-administered medications for this visit.         OBJECTIVE:  Physical Exam:  /78   Pulse 80   Wt 200 lb (90.7 kg)   BMI 31.32 kg/m²     GEN: AAOx3, in NAD, in wheelchair accompanied by wife  HEAD: normocephalic, atraumatic  EYES: no injection or icterus  CVS: RRR  LUNGS: in no acute respiratory distress  MSK:   Upper extremities:              Hands: there is ulnar drift of the fingers, R>L MCP joints w/ synovial thickening no synovitis NTTP, b/l PIP joints w/ bony enlargement no synovitis NTTP, b/l DIP joints w/o synovitis NTTP, full fist formation w/ good  strength              Wrist: there is extensive bony hypertrophy w/ chronic synovial proliferation of the b/l wrist joints, R wrist joint w/ chronic synovitis NTTP, there is significant fusion of the wrist joints, wrist joints NTTP              Elbow: no synovitis or bursitis, FROM  Lower extremities:              Knees: s/p R TKR, L knee w/ trace effusion and bony enlaregment w/ crepitus              Ankles: no synovitis, FROM              Feet: no toe swelling or pain or warmth on palpation w/ FROM, negative MTP squeeze test  INTEGUMENT: scaly plaque on the extensor surface of the R elbow, no nail pitting or onycholysis, no periungual erythema, no petechiae, bruises, or palpable purpura, no patchy alopecia, no nail or periungual changes, no clubbing or digital ulcers    DATA:  Labs:   I personally reviewed interval labs and discussed w/ the pt in detail which showed:    CBC (7/14/22): WBC, H/H and plt count wnl  Lab Results   Component Value Date    WBC 7.5 11/09/2021    HGB 15.3 11/09/2021    HCT 46.2 11/09/2021    MCV 98.0 11/09/2021     11/09/2021    LYMPHOPCT 17.1 11/09/2021    RBC 4.72 11/09/2021    MCH 32.3 11/09/2021    MCHC 33.0 11/09/2021    RDW 13.9 11/09/2021     Scr 1.02, eGFR 80, LFTs wnl (7/14/22)  Lab Results   Component Value Date    ALT <5 (L) 06/22/2022    AST 8 (L) 06/22/2022    ALKPHOS 91 06/22/2022    BILITOT <0.2 06/22/2022     No results found for: MG  Serum Mg wnl (6/13/19)    Lab Results   Component Value Date    VITD25 58.2 02/02/2022     Lab Results   Component Value Date    PTH 25.6 03/11/2019    CALCIUM 9.0 06/22/2022    PHOS 3.7 03/11/2019     Lab Results   Component Value Date    TSH 2.94 02/02/2022    MYT7WNV 1.40 08/05/2020     No results found for: C3     No results found for: C4     No results found for: ANTIDSDNAIGG     No results found for: [facility]     Lab Results   Component Value Date    CRP 0.8 08/20/2019    CRP 0.5 04/04/2019     Lab Results   Component Value Date    SEDRATE 8 08/20/2019    SEDRATE 7 04/04/2019     No results found for: CKTOTAL    Negative RF, CCP (4/4/19)  Negative HLA B27 (4/4/19)  Ferritin and transferrin wnl (6/13/19)  Hemochromatosis genetic test (6/17/19): negative H63D, C282Y, S65C  Negative SPEP and UPEP (4/4/19)    Imaging:  I personally reviewed interval imaging and discussed w/ the pt in detail which included:    CT C-spine (3/12/19):  Postoperative findings of multilevel anterior and posterior fusion and laminectomy w/ significant loosening and extrusion of the posterior fusion hardware at C5, C6, and T1. The interbody grafts at C4-5 and C5-6 are subtotally incorporated with minimal subtle questionable lucency around the C4 screws, no definite screw loosening at C5 or C6. Advanced multilevel cervical degenerative disc disease and facet arthrosis causing multilevel neural foraminal narrowing. Other level specific details are discussed above and on the prior MRI report. The cord compression and cord signal abnormality at C6-7 is much better demonstrated on MRI. X-ray C-spine 8/6/19  IMPRESSION:  Stable postoperative changes from C4-C6 ACDF and C2-T2 posterior fusion. No hardware complications seen. CT L-spine (3/12/19):  Postoperative findings of L4-S1 fusion again identified with similar appearance of bilateral L4 pedicle screw loosening, with subtle increased anterolisthesis at L4-5. Asymmetric widening of a left facet defect at L4-5 is similar to slightly more evident, with suspected pseudoarthrosis along the medial margin of a previously ankylosed right L4-5 facet joint. Unstable L4-5 fusion is again suspected. Definite loosening of the right L5 pedicle screw is not seen. The L5-S1 fusion appears solid. Multilevel degenerative disc disease and facet stenosis with other level specific details discussed above.  The thecal sac is again obscured at the postoperative sites.      X-ray L-spine (12/17/19): IMPRESSION:  1.  Grade 1 spondylolisthesis at L4-5 and mild retrolisthesis at L3-4. No instability with flexion and extension. X-rays (4/4/19):  B/l hands:   Severe degenerative changes about the wrists including the intercarpal spaces and radiocarpal spaces w/ SLAC wrist deformity bilaterally, chronic. TFC complex calcifications are noted. Crystal disease arthropathy is a primary consideration. L knee:   Bicompartmental degenerative changes are noted. SI joints:   No erosion or ankylosis appreciated. Procedures:   EMG (10/17/18): Impression:   1. Electrodiagnostic evidence is consistent with severe right carpal tunnel syndrome. There is median sensory and motor neuropathy and evidence of chronic denervation in the abductor pollicis brevis. 2. Electrodiagnostic evidence is consistent with moderate right ulnar sensory and motor neuropathy, with amplitude drop across the elbow segment. No evidence of acute or chronic denervation in the first dorsal interosseus and flexor carpi ulnaris. 3. Electrodiagnostic evidence is suggestive of chronic right C6 motor radiculopathy. 4. No electrodiagnostic evidence of right brachial plexopathy, peripheral neuropathy, or myopathy in nerves / muscles tested. Above results were discussed w/ the pt in detail during today's visit.

## 2023-03-01 NOTE — ASSESSMENT & PLAN NOTE
- per pt he was diagnosed w/ eczema by his dermatologist and prescribed topicals. This remains active.

## 2023-03-01 NOTE — ASSESSMENT & PLAN NOTE
- annual eye exam for HCQ toxicity monitoring.  - d/w pt that MTX increases the risk of infections, birth defects, liver toxicity, rare lung problems, probable malignancy and bone marrow toxicity. Discussed need to check safety labs 4 weeks after starting MTX followed by labs every 3 months once we reach a steady dose. Strongly recommended alcohol abstinence.

## 2023-03-01 NOTE — PATIENT INSTRUCTIONS
METHOTREXATE DOSING    1. Start taking 4 tablets of Methotrexate once a week for the first 2 weeks. If well-tolerated, then start taking 5 tablets all at once once a week. 2. Check laboratory studies in 4 weeks after starting the Methotrexate  3. Take Folic Acid 1 mg daily     Hold Methotrexate if you are on antibiotics. Restart once and have recovered from the infection. No alcohol while on Methotrexate! PLEASE CALL WITH ANY QUESTIONS OR CONCERNS [phone]    Methotrexate (oral)     Pronunciation: meth oh TREX ate   Brand: Rheumatrex Dose Pack, Trexall   What is methotrexate? Methotrexate interferes with the growth of certain cells of the body, especially cells that reproduce quickly, such as cancer cells, bone marrow cells, and skin cells. Methotrexate is used to treat certain types of cancer of the breast, skin, head and neck, or lung. Methotrexate is also used to treat severe psoriasis and rheumatoid arthritis. Methotrexate is usually given after other medications have been tried without successful treatment of symptoms. Methotrexate may also be used for purposes not listed in this medication guide. What should I discuss with my healthcare provider before taking methotrexate? You should not use this medicine if you are allergic to methotrexate. Do not use methotrexate to treat psoriasis or rheumatoid arthritis if you have:   alcoholism, cirrhosis, or other liver disease;   a blood cell disorder such as anemia (lack of red blood cells) or leukopenia (lack of white blood cells);   a bone marrow disorder; or   if you are breast-feeding a baby. Methotrexate is sometimes used to treat cancer even when patients do have one of the conditions listed above. Your doctor will decide if this treatment is right for you.    To make sure methotrexate is safe for you, tell your doctor if you have:   kidney disease;   a folate deficiency;   pneumonia or lung disease;   stomach ulcers;   any type of infection; or   if you are receiving radiation treatments. Methotrexate can cause birth defects in an unborn baby. Do not use methotrexate to treat psoriasis or rheumatoid arthritis if you are pregnant. Tell your doctor right away if you become pregnant during treatment. You may need to have a negative pregnancy test before starting this treatment. Use birth control to prevent pregnancy while you are using methotrexate, whether you are a man or a woman. Methotrexate use by either parent may cause birth defects. If you are a man, use a condom to keep from causing a pregnancy while you are using methotrexate. Continue using condoms for at least 90 days after your treatment ends. If you are a woman, use an effective form of birth control while you are taking methotrexate, and for at least one cycle of ovulation after your treatment ends. Do not give this medicine to a child without the advice of a doctor. How should I take methotrexate? Follow all directions on your prescription label. Do not take this medicine in larger or smaller amounts or for longer than recommended. You must use the correct dose of methotrexate for your condition. Methotrexate is sometimes taken once or twice per week and not every day. Follow the directions on your prescription label. Some people have  after taking methotrexate every day by accident. Ask your doctor or pharmacist if you have questions about your dose of methotrexate or how often to take it. Use methotrexate regularly to get the most benefit. Get your prescription refilled before you run out of medicine completely. Methotrexate can lower blood cells that help your body fight infections and help your blood to clot. Your blood will need to be tested often, and you may need an occasional liver biopsy. Your cancer treatments may be delayed based on the results of these tests. Store at room temperature away from moisture and heat.    What happens if I miss a dose?   Call your doctor for instructions if you miss a dose of methotrexate. What happens if I overdose? Seek emergency medical attention or call the Poison Help line at 1-903.422.5330. An overdose of methotrexate can be fatal.   What should I avoid while taking methotrexate? This medicine can pass into body fluids (including urine, feces, vomit, semen, vaginal fluid). Patients and caregivers should wear rubber gloves while cleaning up body fluids, handling contaminated trash or laundry or changing diapers. Wash hands before and after removing gloves. Wash soiled clothing and linens separately from other laundry. Body fluids should not be handled by a woman who is pregnant or who may become pregnant. Use condoms during sexual activity to avoid exposure to body fluids. Avoid exposure to sunlight or artificial UV rays (sunlamps or tanning beds), especially if you are being treated for psoriasis. Methotrexate can make your skin more sensitive to sunlight and your psoriasis may worsen. Avoid drinking alcohol while taking methotrexate. What are the possible side effects of methotrexate? Get emergency medical help if you have signs of an allergic reaction: hives; difficulty breathing; swelling of your face, lips, tongue, or throat.    Stop using methotrexate and call your doctor at once if you have:   dry cough, shortness of breath;   diarrhea, vomiting, white patches or sores inside your mouth or on your lips;   blood in your urine or stools;   swelling, rapid weight gain, little or no urinating;   seizure (convulsions);   fever, chills, body aches, flu symptoms;   pale skin, easy bruising, unusual bleeding, weakness, feeling light-headed or short of breath;   liver problems --nausea, upper stomach pain, itching, tired feeling, loss of appetite, dark urine, hao-colored stools, jaundice (yellowing of the skin or eyes); or   severe skin reaction --fever, sore throat, swelling in your face or tongue, burning in your eyes, skin pain, followed by a red or purple skin rash that spreads (especially in the face or upper body) and causes blistering and peeling. Older adults may be more likely to have side effects from this medicine. Common side effects may include:   vomiting, upset stomach;   headache, dizziness, tired feeling; or   blurred vision. This is not a complete list of side effects and others may occur. Call your doctor for medical advice about side effects. You may report side effects to FDA at 2-730-FDA-3649. What other drugs will affect methotrexate? Many drugs can interact with methotrexate. Not all possible interactions are listed here. Tell your doctor about all your medications and any you start or stop using during treatment with methotrexate, especially:   azathioprine;   leucovorin;   phenytoin;   probenecid;   theophylline;   an antibiotic or sulfa drugs;   isotretinoin, retinol, tretinoin;   NSAIDs (non-steroidal anti-inflammatory drugs) --ibuprofen (Advil, Motrin), naproxen (Aleve), celecoxib, diclofenac, indomethacin, meloxicam, and others; or   salicylates --aspirin, Nuprin Backache Caplet, Kaopectate, KneeRelief, Pamprin Cramp Formula, Pepto-Bismol, Tricosal, Trilisate, and others. This list is not complete and many other drugs can interact with methotrexate. This includes prescription and over-the-counter medicines, vitamins, and herbal products. Give a list of all your medicines to any healthcare provider who treats you. Where can I get more information? Your pharmacist can provide more information about methotrexate. Remember, keep this and all other medicines out of the reach of children, never share your medicines with others, and use this medication only for the indication prescribed. Every effort has been made to ensure that the information provided by [address] is accurate, up-to-date, and complete, but no guarantee is made to that effect.  Drug information contained herein may be time sensitive. [employer] information has been compiled for use by healthcare practitioners and consumers in the [place] and therefore [employer] does not warrant that uses outside of the [place] are appropriate, unless specifically indicated otherwise. [facility]"[employer]"s drug information does not endorse drugs, diagnose patients or recommend therapy. [employer] drug information is an informational resource designed to assist licensed healthcare practitioners in caring for their patients and/or to serve consumers viewing this service as a supplement to, and not a substitute for, the expertise, skill, knowledge and judgment of healthcare practitioners. The absence of a warning for a given drug or drug combination in no way should be construed to indicate that the drug or drug combination is safe, effective or appropriate for any given patient. [facility] does not assume any responsibility for any aspect of healthcare administered with the aid of information [employer] provides. The information contained herein is not intended to cover all possible uses, directions, precautions, warnings, drug interactions, allergic reactions, or adverse effects. If you have questions about the drugs you are taking, check with your doctor, nurse or pharmacist.   Copyright 9187-3210 [address]. Version: 11.03. Revision date: 1/6/2015. This information does not replace the advice of a doctor. [employer] disclaims any warranty or liability for your use of this information.    Content Version: 75.9.579424

## 2023-03-19 DIAGNOSIS — M80.00XA AGE-RELATED OSTEOPOROSIS WITH CURRENT PATHOLOGICAL FRACTURE, INITIAL ENCOUNTER: ICD-10-CM

## 2023-03-19 DIAGNOSIS — G20 PARKINSON DISEASE (HCC): ICD-10-CM

## 2023-03-19 DIAGNOSIS — E29.1 HYPOGONADISM IN MALE: ICD-10-CM

## 2023-03-20 RX ORDER — ALENDRONATE SODIUM 70 MG/1
TABLET ORAL
Qty: 4 TABLET | Refills: 3 | Status: SHIPPED | OUTPATIENT
Start: 2023-03-20

## 2023-07-05 ENCOUNTER — TELEPHONE (OUTPATIENT)
Dept: ENDOCRINOLOGY | Age: 68
End: 2023-07-05

## 2023-07-05 DIAGNOSIS — E29.1 HYPOGONADISM IN MALE: ICD-10-CM

## 2023-07-05 DIAGNOSIS — M80.00XA AGE-RELATED OSTEOPOROSIS WITH CURRENT PATHOLOGICAL FRACTURE, INITIAL ENCOUNTER: Primary | ICD-10-CM

## 2023-07-05 DIAGNOSIS — R97.20 ELEVATED PROSTATE SPECIFIC ANTIGEN (PSA): ICD-10-CM

## 2023-07-06 ENCOUNTER — TELEPHONE (OUTPATIENT)
Dept: ENDOCRINOLOGY | Age: 68
End: 2023-07-06

## 2023-07-06 DIAGNOSIS — M80.00XA AGE-RELATED OSTEOPOROSIS WITH CURRENT PATHOLOGICAL FRACTURE, INITIAL ENCOUNTER: Primary | ICD-10-CM

## 2023-07-06 NOTE — TELEPHONE ENCOUNTER
Patient needs an updated order for his dexa scan put in his chart     It has  per scheduling     Please advise

## 2023-07-07 DIAGNOSIS — M80.00XA AGE-RELATED OSTEOPOROSIS WITH CURRENT PATHOLOGICAL FRACTURE, INITIAL ENCOUNTER: Primary | ICD-10-CM

## 2023-07-07 DIAGNOSIS — M81.0 AGE-RELATED OSTEOPOROSIS WITHOUT CURRENT PATHOLOGICAL FRACTURE: ICD-10-CM

## 2023-07-24 DIAGNOSIS — M80.00XA AGE-RELATED OSTEOPOROSIS WITH CURRENT PATHOLOGICAL FRACTURE, INITIAL ENCOUNTER: ICD-10-CM

## 2023-07-24 DIAGNOSIS — E29.1 HYPOGONADISM IN MALE: ICD-10-CM

## 2023-07-24 DIAGNOSIS — G20 PARKINSON DISEASE (HCC): ICD-10-CM

## 2023-07-24 RX ORDER — ALENDRONATE SODIUM 70 MG/1
TABLET ORAL
Qty: 4 TABLET | Refills: 5 | Status: SHIPPED | OUTPATIENT
Start: 2023-07-24

## 2023-10-13 ENCOUNTER — HOSPITAL ENCOUNTER (OUTPATIENT)
Dept: GENERAL RADIOLOGY | Age: 68
Discharge: HOME | End: 2023-10-13
Attending: INTERNAL MEDICINE
Payer: MEDICARE

## 2023-10-13 DIAGNOSIS — M81.0 AGE-RELATED OSTEOPOROSIS WITHOUT CURRENT PATHOLOGICAL FRACTURE: ICD-10-CM

## 2023-10-13 PROCEDURE — 77080 DXA BONE DENSITY AXIAL: CPT

## 2024-01-05 DIAGNOSIS — M80.00XA AGE-RELATED OSTEOPOROSIS WITH CURRENT PATHOLOGICAL FRACTURE, INITIAL ENCOUNTER: ICD-10-CM

## 2024-01-05 DIAGNOSIS — E29.1 HYPOGONADISM IN MALE: ICD-10-CM

## 2024-01-05 DIAGNOSIS — R97.20 ELEVATED PROSTATE SPECIFIC ANTIGEN (PSA): ICD-10-CM

## 2024-01-06 LAB
25(OH)D3 SERPL-MCNC: 70 NG/ML
ALBUMIN SERPL-MCNC: 4.4 G/DL (ref 3.4–5)
ALBUMIN/GLOB SERPL: 1.7 {RATIO} (ref 1.1–2.2)
ALP SERPL-CCNC: 76 U/L (ref 40–129)
ALT SERPL-CCNC: <5 U/L (ref 10–40)
ANION GAP SERPL CALCULATED.3IONS-SCNC: 10 MMOL/L (ref 3–16)
AST SERPL-CCNC: 9 U/L (ref 15–37)
BILIRUB SERPL-MCNC: <0.2 MG/DL (ref 0–1)
BUN SERPL-MCNC: 10 MG/DL (ref 7–20)
CALCIUM SERPL-MCNC: 9.8 MG/DL (ref 8.3–10.6)
CHLORIDE SERPL-SCNC: 101 MMOL/L (ref 99–110)
CO2 SERPL-SCNC: 29 MMOL/L (ref 21–32)
CREAT SERPL-MCNC: 0.9 MG/DL (ref 0.8–1.3)
GFR SERPLBLD CREATININE-BSD FMLA CKD-EPI: >60 ML/MIN/{1.73_M2}
GLUCOSE SERPL-MCNC: 89 MG/DL (ref 70–99)
POTASSIUM SERPL-SCNC: 4.3 MMOL/L (ref 3.5–5.1)
PROT SERPL-MCNC: 7 G/DL (ref 6.4–8.2)
PSA SERPL DL<=0.01 NG/ML-MCNC: 0.95 NG/ML (ref 0–4)
SODIUM SERPL-SCNC: 140 MMOL/L (ref 136–145)

## 2024-01-08 ENCOUNTER — OFFICE VISIT (OUTPATIENT)
Dept: ENDOCRINOLOGY | Age: 69
End: 2024-01-08
Payer: MEDICARE

## 2024-01-08 VITALS
RESPIRATION RATE: 16 BRPM | BODY MASS INDEX: 31.36 KG/M2 | WEIGHT: 199.8 LBS | DIASTOLIC BLOOD PRESSURE: 64 MMHG | HEART RATE: 94 BPM | HEIGHT: 67 IN | SYSTOLIC BLOOD PRESSURE: 97 MMHG

## 2024-01-08 DIAGNOSIS — M80.00XA AGE-RELATED OSTEOPOROSIS WITH CURRENT PATHOLOGICAL FRACTURE, INITIAL ENCOUNTER: Primary | ICD-10-CM

## 2024-01-08 DIAGNOSIS — E29.1 HYPOGONADISM IN MALE: ICD-10-CM

## 2024-01-08 DIAGNOSIS — G20.B1 PARKINSON'S DISEASE WITH DYSKINESIA, UNSPECIFIED WHETHER MANIFESTATIONS FLUCTUATE: ICD-10-CM

## 2024-01-08 PROCEDURE — 1123F ACP DISCUSS/DSCN MKR DOCD: CPT | Performed by: INTERNAL MEDICINE

## 2024-01-08 PROCEDURE — G8417 CALC BMI ABV UP PARAM F/U: HCPCS | Performed by: INTERNAL MEDICINE

## 2024-01-08 PROCEDURE — 3017F COLORECTAL CA SCREEN DOC REV: CPT | Performed by: INTERNAL MEDICINE

## 2024-01-08 PROCEDURE — G8427 DOCREV CUR MEDS BY ELIG CLIN: HCPCS | Performed by: INTERNAL MEDICINE

## 2024-01-08 PROCEDURE — G8484 FLU IMMUNIZE NO ADMIN: HCPCS | Performed by: INTERNAL MEDICINE

## 2024-01-08 PROCEDURE — 1036F TOBACCO NON-USER: CPT | Performed by: INTERNAL MEDICINE

## 2024-01-08 PROCEDURE — 99214 OFFICE O/P EST MOD 30 MIN: CPT | Performed by: INTERNAL MEDICINE

## 2024-01-08 NOTE — PROGRESS NOTES
Patient is seen here for management of Osteoporosis & hypogonadism.    He has been diagnosed with Osteoporosis in 2013 by his orthopedic surgeon Dr. Reyez he was started on Fosamax, he recalls it was switched to Boniva monthly, then he needed dental implants so the bisphosphonates were stopped and were never resumed after wards.  As he never followed up with the orthopedic doctor again.  He also remembers that his dental implant was not successful .  He has taken the following medications for his Osteoporosis:Fosamax     He has no history of fracture of long bones of upper or lower legs. He has  history of vertebral fractures T8 after a fall from ladder in 2009( fall 6-8 feet )   .  He has approximately 3 inches height loss as compared to his  young age.  He has underwent C-spine fusion surgery as well as lumbar spine fusion surgery.    He  does  take vitamin D supplements. + MVI. He  has  1-2 daily servings of cheese, milk or yogurt daily. He exercises regularly.     He  has no history of long term steroids, chemotherapy, multiple myeloma, prolonged immobilization, liver or kidney disease, malabsorption, organ transplant, or immunosuppressive medications.   No history of diabetes. No history of any other agents with adverse effects on bony skeleton. He is not prone to falls and no history of recent falls.     No history of rheumatoid arthritis or other autoimmune disorders. positive history of kidney stones in the past once .     No history of proximal muscle weakness, thinning of skin, easy bruisability, centripetal accumulation of fat and excessive weight gain recently to suggest Cushing's syndrome.     He does not smoke. No excessive alcohol use.     There is no parental history of hip fracture. No family history of Osteoporosis.     He had dental implants and stopped fosamax a year before that and never resumed it  and there is no upcoming dental surgeries planned.    He has severe OA s/p rt knee replacement

## 2024-01-09 LAB
SHBG SERPL-SCNC: 83 NMOL/L (ref 11–80)
TESTOST FREE SERPL-MCNC: 59.8 PG/ML (ref 47–244)
TESTOST SERPL-MCNC: 551 NG/DL (ref 220–1000)

## 2024-01-12 DIAGNOSIS — M80.00XA AGE-RELATED OSTEOPOROSIS WITH CURRENT PATHOLOGICAL FRACTURE, INITIAL ENCOUNTER: ICD-10-CM

## 2024-01-12 DIAGNOSIS — G20.A1 PARKINSON DISEASE: ICD-10-CM

## 2024-01-12 DIAGNOSIS — E29.1 HYPOGONADISM IN MALE: ICD-10-CM

## 2024-01-12 RX ORDER — ALENDRONATE SODIUM 70 MG/1
TABLET ORAL
Qty: 12 TABLET | Refills: 1 | Status: SHIPPED | OUTPATIENT
Start: 2024-01-12

## 2024-06-05 ENCOUNTER — TELEPHONE (OUTPATIENT)
Dept: ENDOCRINOLOGY | Age: 69
End: 2024-06-05

## 2024-06-05 DIAGNOSIS — R53.82 CHRONIC FATIGUE, UNSPECIFIED: ICD-10-CM

## 2024-06-05 DIAGNOSIS — M11.89 CALCIUM PYROPHOSPHATE ARTHROPATHY OF MULTIPLE SITES: ICD-10-CM

## 2024-06-05 DIAGNOSIS — G20.B1 PARKINSON'S DISEASE WITH DYSKINESIA, UNSPECIFIED WHETHER MANIFESTATIONS FLUCTUATE (HCC): ICD-10-CM

## 2024-06-05 DIAGNOSIS — M81.0 AGE-RELATED OSTEOPOROSIS WITHOUT CURRENT PATHOLOGICAL FRACTURE: ICD-10-CM

## 2024-06-05 DIAGNOSIS — R97.20 ELEVATED PROSTATE SPECIFIC ANTIGEN (PSA): Primary | ICD-10-CM

## 2024-06-06 DIAGNOSIS — R97.20 ELEVATED PROSTATE SPECIFIC ANTIGEN (PSA): ICD-10-CM

## 2024-06-06 DIAGNOSIS — G20.B1 PARKINSON'S DISEASE WITH DYSKINESIA, UNSPECIFIED WHETHER MANIFESTATIONS FLUCTUATE (HCC): ICD-10-CM

## 2024-06-06 DIAGNOSIS — M11.89 CALCIUM PYROPHOSPHATE ARTHROPATHY OF MULTIPLE SITES: ICD-10-CM

## 2024-06-06 DIAGNOSIS — R53.82 CHRONIC FATIGUE, UNSPECIFIED: ICD-10-CM

## 2024-06-06 DIAGNOSIS — M81.0 AGE-RELATED OSTEOPOROSIS WITHOUT CURRENT PATHOLOGICAL FRACTURE: ICD-10-CM

## 2024-06-06 LAB
25(OH)D3 SERPL-MCNC: 75.3 NG/ML
ALBUMIN SERPL-MCNC: 4.2 G/DL (ref 3.4–5)
ALBUMIN/GLOB SERPL: 1.4 {RATIO} (ref 1.1–2.2)
ALP SERPL-CCNC: 70 U/L (ref 40–129)
ALT SERPL-CCNC: <5 U/L (ref 10–40)
ANION GAP SERPL CALCULATED.3IONS-SCNC: 12 MMOL/L (ref 3–16)
AST SERPL-CCNC: 7 U/L (ref 15–37)
BILIRUB SERPL-MCNC: 0.3 MG/DL (ref 0–1)
BUN SERPL-MCNC: 10 MG/DL (ref 7–20)
CALCIUM SERPL-MCNC: 9.7 MG/DL (ref 8.3–10.6)
CHLORIDE SERPL-SCNC: 101 MMOL/L (ref 99–110)
CO2 SERPL-SCNC: 27 MMOL/L (ref 21–32)
CREAT SERPL-MCNC: 0.9 MG/DL (ref 0.8–1.3)
GFR SERPLBLD CREATININE-BSD FMLA CKD-EPI: >90 ML/MIN/{1.73_M2}
GLUCOSE SERPL-MCNC: 98 MG/DL (ref 70–99)
POTASSIUM SERPL-SCNC: 4.3 MMOL/L (ref 3.5–5.1)
PROT SERPL-MCNC: 7.1 G/DL (ref 6.4–8.2)
SODIUM SERPL-SCNC: 140 MMOL/L (ref 136–145)
TSH SERPL DL<=0.005 MIU/L-ACNC: 2.45 UIU/ML (ref 0.27–4.2)

## 2024-06-10 ENCOUNTER — OFFICE VISIT (OUTPATIENT)
Dept: ENDOCRINOLOGY | Age: 69
End: 2024-06-10
Payer: MEDICARE

## 2024-06-10 VITALS
HEART RATE: 87 BPM | HEIGHT: 67 IN | BODY MASS INDEX: 31.29 KG/M2 | SYSTOLIC BLOOD PRESSURE: 125 MMHG | DIASTOLIC BLOOD PRESSURE: 67 MMHG

## 2024-06-10 DIAGNOSIS — G20.B1 PARKINSON'S DISEASE WITH DYSKINESIA, UNSPECIFIED WHETHER MANIFESTATIONS FLUCTUATE (HCC): ICD-10-CM

## 2024-06-10 DIAGNOSIS — M81.0 AGE-RELATED OSTEOPOROSIS WITHOUT CURRENT PATHOLOGICAL FRACTURE: Primary | ICD-10-CM

## 2024-06-10 DIAGNOSIS — E29.1 HYPOGONADISM IN MALE: ICD-10-CM

## 2024-06-10 DIAGNOSIS — M48.061 SPINAL STENOSIS AT L4-L5 LEVEL: ICD-10-CM

## 2024-06-10 DIAGNOSIS — M11.89 CALCIUM PYROPHOSPHATE ARTHROPATHY OF MULTIPLE SITES: ICD-10-CM

## 2024-06-10 PROCEDURE — G8427 DOCREV CUR MEDS BY ELIG CLIN: HCPCS | Performed by: INTERNAL MEDICINE

## 2024-06-10 PROCEDURE — 1123F ACP DISCUSS/DSCN MKR DOCD: CPT | Performed by: INTERNAL MEDICINE

## 2024-06-10 PROCEDURE — G2211 COMPLEX E/M VISIT ADD ON: HCPCS | Performed by: INTERNAL MEDICINE

## 2024-06-10 PROCEDURE — G8417 CALC BMI ABV UP PARAM F/U: HCPCS | Performed by: INTERNAL MEDICINE

## 2024-06-10 PROCEDURE — 3017F COLORECTAL CA SCREEN DOC REV: CPT | Performed by: INTERNAL MEDICINE

## 2024-06-10 PROCEDURE — 1036F TOBACCO NON-USER: CPT | Performed by: INTERNAL MEDICINE

## 2024-06-10 PROCEDURE — 99214 OFFICE O/P EST MOD 30 MIN: CPT | Performed by: INTERNAL MEDICINE

## 2024-06-10 NOTE — PROGRESS NOTES
porosity was identified during surgery.  ----DEXA scan was in March of 2019 done at [facility] which showed  lumbar spine T score of -1.3, left hip T score of 0.2 which is within normal limits, right femoral neck has a T score of -1.6 which is osteopenic.  --- Patient has  prior history of thoracic spine compression fracture.  --- his 24-hr urine calcium and creatinine was normal at 330 for on March 2019. Thyroid function test as well as parathyroid hormone levels were also within normal limits . His celiac disease panel was negative and bone turnover markers were not done although they were ordered..    Patient was counseled on adequate calcium and Vitamin D intake and on fall precautions to minimize fracture risk.   ---Advised to take a total of 1200 mg of elemental calcium (including diet and supplements). He  was provided with written information on sources of dietary calcium and general advice on maintaining optimal skeletal health.    We also discussed the side effects and contraindications of Forteo therapy including increased risk of osteosarcoma in animal studies. Patient has no apparent contraindications to Forteo therapy     ---Hypogonadism with inappropriately normal FSH and LH  Stopped testosterone in dec 2021 and doesn't feels any difference in symptoms.  testo level was 674 in jume 2022 has been off testosterone for over 6 months   His PSA was within normal limits which was done on April third 2019  PSa 0.84 in December 2020      --Parkinson disease   He has been on medication for years   Stable follows with neurology.  Also had Kamille's syndrome and cluster migraine headaches    --Severe  Arthritis  Follows Cristobal Bowser, he is now on Plaquenil.    --. Spinal stenosis at L4-L5 level  Status post decompression fusion  ---underwent successful revision of C2-posterior fusion on June 2019 per Dr. Garcia  Spinal stimulator          Reviewed and/or ordered clinical lab results Yes  Reviewed and/or ordered

## 2025-06-12 DIAGNOSIS — M81.0 AGE-RELATED OSTEOPOROSIS WITHOUT CURRENT PATHOLOGICAL FRACTURE: ICD-10-CM

## 2025-06-12 DIAGNOSIS — E29.1 HYPOGONADISM IN MALE: ICD-10-CM

## 2025-06-12 LAB
25(OH)D3 SERPL-MCNC: 75.6 NG/ML
ALBUMIN SERPL-MCNC: 4.2 G/DL (ref 3.4–5)
ALBUMIN/GLOB SERPL: 1.6 {RATIO} (ref 1.1–2.2)
ALP SERPL-CCNC: 80 U/L (ref 40–129)
ALT SERPL-CCNC: 6 U/L (ref 10–40)
ANION GAP SERPL CALCULATED.3IONS-SCNC: 12 MMOL/L (ref 3–16)
AST SERPL-CCNC: 16 U/L (ref 15–37)
BILIRUB SERPL-MCNC: 0.3 MG/DL (ref 0–1)
BUN SERPL-MCNC: 14 MG/DL (ref 7–20)
CALCIUM SERPL-MCNC: 9.7 MG/DL (ref 8.3–10.6)
CHLORIDE SERPL-SCNC: 100 MMOL/L (ref 99–110)
CO2 SERPL-SCNC: 26 MMOL/L (ref 21–32)
CREAT SERPL-MCNC: 0.9 MG/DL (ref 0.8–1.3)
GFR SERPLBLD CREATININE-BSD FMLA CKD-EPI: >90 ML/MIN/{1.73_M2}
GLUCOSE SERPL-MCNC: 111 MG/DL (ref 70–99)
POTASSIUM SERPL-SCNC: 4.4 MMOL/L (ref 3.5–5.1)
PROT SERPL-MCNC: 6.8 G/DL (ref 6.4–8.2)
PTH-INTACT SERPL-MCNC: 32.6 PG/ML (ref 14–72)
SODIUM SERPL-SCNC: 138 MMOL/L (ref 136–145)

## 2025-06-14 LAB — COLLAGEN CTX SERPL-MCNC: 386 PG/ML (ref 118–776)

## 2025-06-16 ENCOUNTER — OFFICE VISIT (OUTPATIENT)
Dept: ENDOCRINOLOGY | Age: 70
End: 2025-06-16
Payer: MEDICARE

## 2025-06-16 VITALS
WEIGHT: 199 LBS | DIASTOLIC BLOOD PRESSURE: 52 MMHG | HEIGHT: 67 IN | SYSTOLIC BLOOD PRESSURE: 101 MMHG | HEART RATE: 72 BPM | BODY MASS INDEX: 31.23 KG/M2

## 2025-06-16 DIAGNOSIS — R53.82 CHRONIC FATIGUE, UNSPECIFIED: ICD-10-CM

## 2025-06-16 DIAGNOSIS — E29.1 HYPOGONADISM IN MALE: ICD-10-CM

## 2025-06-16 DIAGNOSIS — M81.0 AGE-RELATED OSTEOPOROSIS WITHOUT CURRENT PATHOLOGICAL FRACTURE: Primary | ICD-10-CM

## 2025-06-16 DIAGNOSIS — M81.0 AGE-RELATED OSTEOPOROSIS WITHOUT CURRENT PATHOLOGICAL FRACTURE: ICD-10-CM

## 2025-06-16 DIAGNOSIS — G20.B1 PARKINSON'S DISEASE WITH DYSKINESIA, UNSPECIFIED WHETHER MANIFESTATIONS FLUCTUATE (HCC): ICD-10-CM

## 2025-06-16 DIAGNOSIS — M11.89 CALCIUM PYROPHOSPHATE ARTHROPATHY OF MULTIPLE SITES: ICD-10-CM

## 2025-06-16 DIAGNOSIS — M48.061 SPINAL STENOSIS AT L4-L5 LEVEL: ICD-10-CM

## 2025-06-16 LAB — PINP SER-MCNC: 50 UG/L (ref 22–105)

## 2025-06-16 PROCEDURE — 1123F ACP DISCUSS/DSCN MKR DOCD: CPT | Performed by: INTERNAL MEDICINE

## 2025-06-16 PROCEDURE — 99214 OFFICE O/P EST MOD 30 MIN: CPT | Performed by: INTERNAL MEDICINE

## 2025-06-16 PROCEDURE — 3017F COLORECTAL CA SCREEN DOC REV: CPT | Performed by: INTERNAL MEDICINE

## 2025-06-16 PROCEDURE — G2211 COMPLEX E/M VISIT ADD ON: HCPCS | Performed by: INTERNAL MEDICINE

## 2025-06-16 PROCEDURE — G8427 DOCREV CUR MEDS BY ELIG CLIN: HCPCS | Performed by: INTERNAL MEDICINE

## 2025-06-16 PROCEDURE — 1159F MED LIST DOCD IN RCRD: CPT | Performed by: INTERNAL MEDICINE

## 2025-06-16 PROCEDURE — G8417 CALC BMI ABV UP PARAM F/U: HCPCS | Performed by: INTERNAL MEDICINE

## 2025-06-16 PROCEDURE — 1160F RVW MEDS BY RX/DR IN RCRD: CPT | Performed by: INTERNAL MEDICINE

## 2025-06-16 PROCEDURE — 1036F TOBACCO NON-USER: CPT | Performed by: INTERNAL MEDICINE

## 2025-06-16 NOTE — PROGRESS NOTES
-----------------------------  Dexcom Clarity  -----------------------------  Landen Arreguinbrianrodrigue    YOB: 1955    Generated at: Mon, Jun 16, 2025 1:26 PM EDT    Reporting period: Tue Fabricio 3, 2025 - Mon Jun 16, 2025  -----------------------------  Glucose Details    Average glucose: 142 mg/dL    GMI: 6.7%    Standard deviation: 33 mg/dL    Coefficient of Variation: 23.3%  -----------------------------  Time in Range    Very High: 0%    High: 15%    In Range: 85%    Low: 0%    Very Low: <1%    Target Range   mg/dL    -----------------------------  Sensor usage    Days with data: 14/14    Time active: 99%    Avg. calibrations per day: 0.1    
(including diet and supplements). He  was provided with written information on sources of dietary calcium and general advice on maintaining optimal skeletal health.    We also discussed the side effects and contraindications of Forteo therapy including increased risk of osteosarcoma in animal studies. Patient has no apparent contraindications to Forteo therapy     ---hx of Hypogonadism testosterone level now has been normal without any supplement  Stopped testosterone in dec 2021 and doesn't feels any difference in symptoms.  testo level was 674 in jume 2022 has been off testosterone for over 6 months   Testosterone level was 551 in January 2024, with a PSA of 0.95 in January 2024        --Parkinson disease   He has been on medication for years   Stable follows with neurology.  Also had Kamille's syndrome and cluster migraine headaches    --Severe  Arthritis  Follows Cristobal Bowser, he is now on Plaquenil.    --. Spinal stenosis at L4-L5 level  Status post decompression fusion  ---underwent successful revision of C2-posterior fusion on June 2019 per Dr. Garcia  Spinal stimulator          Reviewed and/or ordered clinical lab results Yes  Reviewed and/or ordered radiology tests Yes  Reviewed and/or ordered other diagnostic tests Yes  Discussed test results with performing physician Yes  Made a decision to obtain old records Yes  Reviewed and summarized old records Yes      Landen Chacon was counseled regarding symptoms of current diagnosis, course and complications of disease if inadequately treated, side effects of medications, diagnosis, treatment options, and prognosis, risks, benefits, complications, and alternatives of treatment, labs, imaging and other studies and treatment targets and goals.  He understands instructions and counseling.    These diagnosis were discussed and reviewed with the patient including the advantages of drug therapy. He was counseled at this visit on the following: diabetes complication

## 2025-06-18 LAB
COLLAGEN NTX/CREAT UR-SRTO: 47 (ref 21–83)
CREAT 24H UR-MCNC: 87 MG/DL

## (undated) DEVICE — Z DISCONTINUED USE 2749457 TUBING SAMP AD W12.5XH8.4IN D9.1IN NSL ORAL SMRT CAPNOLINE

## (undated) DEVICE — SNARE ENDOSCP L240CM SHTH DIA24MM LOOP W10MM POLYP RND REINF

## (undated) DEVICE — FORCEPS BX L240CM JAW DIA2.8MM L CAP W/ NDL MIC MESH TOOTH

## (undated) DEVICE — SNARES COLD OVAL 10MM THIN

## (undated) DEVICE — TRAP SPEC RETRV CLR PLAS POLYP IN LN SUCT QUIK CTCH

## (undated) DEVICE — CANNULA SAMP CO2 AD GRN 7FT CO2 AND 7FT O2 TBNG UNIV CONN